# Patient Record
Sex: FEMALE | Race: WHITE | NOT HISPANIC OR LATINO | Employment: OTHER | URBAN - METROPOLITAN AREA
[De-identification: names, ages, dates, MRNs, and addresses within clinical notes are randomized per-mention and may not be internally consistent; named-entity substitution may affect disease eponyms.]

---

## 2024-10-07 ENCOUNTER — OFFICE VISIT (OUTPATIENT)
Dept: GYNECOLOGIC ONCOLOGY | Facility: CLINIC | Age: 74
End: 2024-10-07
Payer: MEDICARE

## 2024-10-07 VITALS
TEMPERATURE: 98.4 F | SYSTOLIC BLOOD PRESSURE: 128 MMHG | RESPIRATION RATE: 17 BRPM | BODY MASS INDEX: 43.4 KG/M2 | DIASTOLIC BLOOD PRESSURE: 69 MMHG | HEART RATE: 78 BPM | WEIGHT: 293 LBS | HEIGHT: 69 IN | OXYGEN SATURATION: 92 %

## 2024-10-07 DIAGNOSIS — N83.209 CYST OF OVARY, UNSPECIFIED LATERALITY: Primary | ICD-10-CM

## 2024-10-07 PROCEDURE — 99204 OFFICE O/P NEW MOD 45 MIN: CPT | Performed by: OBSTETRICS & GYNECOLOGY

## 2024-10-07 PROCEDURE — 3008F BODY MASS INDEX DOCD: CPT | Performed by: OBSTETRICS & GYNECOLOGY

## 2024-10-07 PROCEDURE — 1126F AMNT PAIN NOTED NONE PRSNT: CPT | Performed by: OBSTETRICS & GYNECOLOGY

## 2024-10-07 PROCEDURE — 1036F TOBACCO NON-USER: CPT | Performed by: OBSTETRICS & GYNECOLOGY

## 2024-10-07 PROCEDURE — 99214 OFFICE O/P EST MOD 30 MIN: CPT | Performed by: OBSTETRICS & GYNECOLOGY

## 2024-10-07 PROCEDURE — 1159F MED LIST DOCD IN RCRD: CPT | Performed by: OBSTETRICS & GYNECOLOGY

## 2024-10-07 RX ORDER — DESONIDE 0.5 MG/G
CREAM TOPICAL
COMMUNITY

## 2024-10-07 RX ORDER — CLOTRIMAZOLE 1 %
CREAM (GRAM) TOPICAL
COMMUNITY
Start: 2024-08-31

## 2024-10-07 RX ORDER — TIRZEPATIDE 7.5 MG/.5ML
INJECTION, SOLUTION SUBCUTANEOUS
COMMUNITY
Start: 2024-09-11

## 2024-10-07 RX ORDER — PRAVASTATIN SODIUM 20 MG/1
20 TABLET ORAL
COMMUNITY
Start: 2024-08-31

## 2024-10-07 RX ORDER — METFORMIN HYDROCHLORIDE 1000 MG/1
TABLET ORAL
COMMUNITY
Start: 2024-08-31

## 2024-10-07 RX ORDER — AMIODARONE HYDROCHLORIDE 200 MG/1
TABLET ORAL
COMMUNITY
Start: 2024-09-05

## 2024-10-07 RX ORDER — CYCLOBENZAPRINE HCL 10 MG
TABLET ORAL
COMMUNITY
Start: 2021-06-24

## 2024-10-07 RX ORDER — METOPROLOL TARTRATE 25 MG/1
25 TABLET, FILM COATED ORAL
COMMUNITY
Start: 2024-09-05

## 2024-10-07 RX ORDER — PANTOPRAZOLE SODIUM 40 MG/1
TABLET, DELAYED RELEASE ORAL
COMMUNITY
Start: 2024-08-31

## 2024-10-07 RX ORDER — MECLIZINE HYDROCHLORIDE 25 MG/1
TABLET ORAL 3 TIMES DAILY PRN
COMMUNITY
Start: 2021-06-10

## 2024-10-07 RX ORDER — REVEFENACIN 175 UG/3ML
175 SOLUTION RESPIRATORY (INHALATION) DAILY
COMMUNITY

## 2024-10-07 RX ORDER — LEVOTHYROXINE SODIUM 50 UG/1
TABLET ORAL
COMMUNITY
Start: 2024-08-31

## 2024-10-07 RX ORDER — ROPINIROLE 4 MG/1
1 TABLET, FILM COATED ORAL 3 TIMES DAILY
COMMUNITY
Start: 2018-09-21

## 2024-10-07 RX ORDER — ASCORBIC ACID 125 MG
TABLET,CHEWABLE ORAL
COMMUNITY
Start: 2018-09-21

## 2024-10-07 RX ORDER — AMLODIPINE BESYLATE 10 MG/1
TABLET ORAL
COMMUNITY
Start: 2024-09-16

## 2024-10-07 RX ORDER — HYDROXYZINE PAMOATE 25 MG/1
25 CAPSULE ORAL 3 TIMES DAILY PRN
COMMUNITY

## 2024-10-07 RX ORDER — LISINOPRIL 20 MG/1
TABLET ORAL 2 TIMES DAILY
COMMUNITY
Start: 2024-09-16

## 2024-10-07 RX ORDER — APIXABAN 5 MG/1
5 TABLET, FILM COATED ORAL 2 TIMES DAILY
COMMUNITY
Start: 2024-09-05

## 2024-10-07 RX ORDER — DIGOXIN 125 MCG
0.12 TABLET ORAL
COMMUNITY
Start: 2024-09-05

## 2024-10-07 ASSESSMENT — PAIN SCALES - GENERAL: PAINLEVEL: 0-NO PAIN

## 2024-10-07 NOTE — PROGRESS NOTES
Patient ID: Stewrat Mims is a 74 y.o. female.  Referring Physician: No referring provider defined for this encounter.  Primary Care Provider: Milady Jeffery MD      Subjective    Referred by [facility]    73 yo with pelvic mass    Mass history  US pelvis 8/2024 - R ovary 8 X 6 X 5.3cm cysts with septations  CT - no enlarged Lns or ascites.   13    Ob/gyn  Menopausal since late 40s.  G0.  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  No one has had genetic testing      Cyst was incidental finding at time of ER visit for kidney stone    No changes to bowel or bladder habits  No bleeding    Eating well    No weight loss or weight gain        Review of Systems   All other systems reviewed and are negative.       Objective   BSA: There is no height or weight on file to calculate BSA.  There were no vitals taken for this visit.     No family history on file.    Jeana Mims  has no history on file for tobacco use.  She  has no history on file for alcohol use.  She  has no history on file for drug use.    Physical Exam  Constitutional:       Appearance: Normal appearance.   HENT:      Head: Normocephalic and atraumatic.      Nose: Nose normal.      Mouth/Throat:      Mouth: Mucous membranes are moist.   Eyes:      Conjunctiva/sclera: Conjunctivae normal.      Pupils: Pupils are equal, round, and reactive to light.   Cardiovascular:      Rate and Rhythm: Normal rate and regular rhythm.      Pulses: Normal pulses.   Pulmonary:      Effort: Pulmonary effort is normal.      Breath sounds: Normal breath sounds.   Abdominal:      General: There is no distension.      Palpations: Abdomen is soft. There is no mass.       Tenderness: There is no abdominal tenderness.      Hernia: No hernia is present.   Genitourinary:     General: Normal vulva.      Exam position: Lithotomy position.      Vagina: Normal.      Cervix: Normal.      Uterus: Normal.       Adnexa: Left adnexa normal.        Right: Mass present.         Left: No mass.        Comments: No parametrial extension on rectovaginal exam  Ovarian mass is mobile on R side.  Musculoskeletal:         General: No swelling.      Right lower leg: No edema.      Left lower leg: No edema.   Skin:     General: Skin is warm and dry.   Neurological:      General: No focal deficit present.      Mental Status: She is alert.   Psychiatric:         Mood and Affect: Mood normal.         Thought Content: Thought content normal.         Performance Status:  Symptomatic; fully ambulatory    Assessment/Plan      Oncology History    No history exists.          Problem List Items Addressed This Visit             ICD-10-CM    Ovarian retention cyst - Primary N83.209    Relevant Orders    US pelvis transvaginal       Treatment Plans       No treatment plans exist          Reviewed CT and US reports (images at [facility]).  Discussed differential diagnosis, including both benign and malignant ovarian neoplasms.  Overall benign process is favored given overall cystic appearance, absence of symptoms, and no other findings on CT scan with normal .  Cyst discovered in context of new diagnosis of a-fib.    We discussed options for further evaluation and definitive surgery.  We came to a shared decision for repeat US in Jan 2025 to assess for interval growth /change of cyst.  Will have imaging done at [facility] and will push images to PACS.    Follow up to discuss repeat imaging.  Patient will phone the office with any new or concerning symptoms.

## 2024-10-21 ENCOUNTER — TELEPHONE (OUTPATIENT)
Dept: GYNECOLOGIC ONCOLOGY | Facility: HOSPITAL | Age: 74
End: 2024-10-21
Payer: MEDICARE

## 2024-10-21 NOTE — TELEPHONE ENCOUNTER
Phoned patient to notify that Dr. Helton reviewed 10/17/24 pelvic ultrasound result and recommends repeat ultrasound in January prior to 1/6/25 appointment with Dr. Helton.   Message left on patient voice mail with pelvic ultrasound recommendations.     Message sent to coordinators requesting scheduling of January pelvic ultrasound appointment.

## 2025-01-06 ENCOUNTER — APPOINTMENT (OUTPATIENT)
Dept: GYNECOLOGIC ONCOLOGY | Facility: CLINIC | Age: 75
End: 2025-01-06
Payer: MEDICARE

## 2025-01-13 ENCOUNTER — OFFICE VISIT (OUTPATIENT)
Dept: GYNECOLOGIC ONCOLOGY | Facility: CLINIC | Age: 75
End: 2025-01-13
Payer: MEDICARE

## 2025-01-13 VITALS
RESPIRATION RATE: 16 BRPM | HEART RATE: 75 BPM | OXYGEN SATURATION: 90 % | DIASTOLIC BLOOD PRESSURE: 83 MMHG | TEMPERATURE: 98.1 F | SYSTOLIC BLOOD PRESSURE: 150 MMHG | BODY MASS INDEX: 45.82 KG/M2 | WEIGHT: 293 LBS

## 2025-01-13 DIAGNOSIS — R93.89 THICKENED ENDOMETRIUM: ICD-10-CM

## 2025-01-13 DIAGNOSIS — N83.209 CYST OF OVARY, UNSPECIFIED LATERALITY: Primary | ICD-10-CM

## 2025-01-13 PROCEDURE — 1159F MED LIST DOCD IN RCRD: CPT | Performed by: OBSTETRICS & GYNECOLOGY

## 2025-01-13 PROCEDURE — 99213 OFFICE O/P EST LOW 20 MIN: CPT | Performed by: OBSTETRICS & GYNECOLOGY

## 2025-01-13 PROCEDURE — 1126F AMNT PAIN NOTED NONE PRSNT: CPT | Performed by: OBSTETRICS & GYNECOLOGY

## 2025-01-13 PROCEDURE — 1036F TOBACCO NON-USER: CPT | Performed by: OBSTETRICS & GYNECOLOGY

## 2025-01-13 PROCEDURE — 1160F RVW MEDS BY RX/DR IN RCRD: CPT | Performed by: OBSTETRICS & GYNECOLOGY

## 2025-01-13 ASSESSMENT — PAIN SCALES - GENERAL: PAINLEVEL_OUTOF10: 0-NO PAIN

## 2025-01-13 NOTE — PROGRESS NOTES
Patient ID: Stewart Mims is a 74 y.o. female.  Referring Physician: No referring provider defined for this encounter.  Primary Care Provider: Milady Jeffery MD    Subjective    Referred by [facility]    75 yo with pelvic mass    Mass history  US pelvis 8/2024 - R ovary 8 X 6 X 5.3cm cysts with septations  CT - no enlarged Lns or ascites.   14  Repeat US 1/2025 - 1. A 6.9 cm septated right ovarian cyst appears similar to the prior. Ultrasound follow up annually recommended.   2. Thickened endometrium. Tissue sampling recommended.     Ob/gyn  Menopausal since late 40s.  G0.  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  No one has had genetic testing    Denies bleeding.  No new symptoms              Objective    BSA: 2.62 meters squared  /83 (BP Location: Right arm, Patient Position: Sitting, BP Cuff Size: Adult)   Pulse 75   Temp 36.7 °C (98.1 °F) (Temporal)   Resp 16   Wt 141 kg (310 lb 6.5 oz)   SpO2 90%   BMI 45.82 kg/m²      Physical Exam  Vitals and nursing note reviewed. Exam conducted with a chaperone present.   Constitutional:       Appearance: Normal appearance.   HENT:      Head: Normocephalic.   Eyes:      Pupils: Pupils are equal, round, and reactive to light.   Cardiovascular:      Rate and Rhythm: Normal rate and regular rhythm.   Pulmonary:      Effort: Pulmonary effort is normal.      Breath sounds: Normal breath sounds.   Abdominal:      General: Abdomen is flat.      Palpations: Abdomen is soft.      Tenderness: There is no abdominal tenderness.   Genitourinary:     Vagina: Normal.      Cervix: Normal.      Comments: Unable to pass pipelle despite tenaculum placement.  Musculoskeletal:          General: Normal range of motion.      Cervical back: Normal range of motion and neck supple.   Skin:     General: Skin is warm and dry.   Neurological:      Mental Status: She is alert and oriented to person, place, and time.   Psychiatric:         Mood and Affect: Mood normal.         Behavior: Behavior normal.         Performance Status:  Symptomatic; fully ambulatory    Assessment/Plan     Oncology History    No history exists.        Problem List Items Addressed This Visit             ICD-10-CM    Ovarian retention cyst - Primary N83.209    Thickened endometrium R93.89    Relevant Orders    Case Request Operating Room: dilation and curettage (Completed)    Request for Pre-Admission Testing Visit       Treatment Plans       No treatment plans exist          We reviewed ultrasound findings.  Stable appearance of ovarian cyst but now with endometrial thickening .  Patient is asymptomatic but does have risk factors for endometrial hyperplasia and carcinoma.  Unable to obtain office biopsy today    Plan to schedule for outpatient D&C.  Will require scheduling at [facility] given comorbidities.  Risks of surgery reviewed.    She will be scheduled for preadmission testing before surgery

## 2025-02-12 ENCOUNTER — PRE-ADMISSION TESTING (OUTPATIENT)
Dept: PREADMISSION TESTING | Facility: HOSPITAL | Age: 75
End: 2025-02-12
Payer: MEDICARE

## 2025-02-12 VITALS
OXYGEN SATURATION: 93 % | DIASTOLIC BLOOD PRESSURE: 70 MMHG | HEIGHT: 71 IN | HEART RATE: 70 BPM | WEIGHT: 293 LBS | BODY MASS INDEX: 41.02 KG/M2 | SYSTOLIC BLOOD PRESSURE: 157 MMHG | TEMPERATURE: 97.3 F

## 2025-02-12 DIAGNOSIS — R93.89 THICKENED ENDOMETRIUM: ICD-10-CM

## 2025-02-12 DIAGNOSIS — E03.9 HYPOTHYROIDISM, UNSPECIFIED TYPE: ICD-10-CM

## 2025-02-12 DIAGNOSIS — E66.01 MORBID OBESITY (MULTI): ICD-10-CM

## 2025-02-12 DIAGNOSIS — Z01.818 PREOPERATIVE EXAMINATION: Primary | ICD-10-CM

## 2025-02-12 LAB
ANION GAP SERPL CALC-SCNC: 15 MMOL/L (ref 10–20)
BUN SERPL-MCNC: 18 MG/DL (ref 6–23)
CALCIUM SERPL-MCNC: 9.8 MG/DL (ref 8.6–10.6)
CHLORIDE SERPL-SCNC: 104 MMOL/L (ref 98–107)
CO2 SERPL-SCNC: 26 MMOL/L (ref 21–32)
CREAT SERPL-MCNC: 0.97 MG/DL (ref 0.5–1.05)
EGFRCR SERPLBLD CKD-EPI 2021: 62 ML/MIN/1.73M*2
ERYTHROCYTE [DISTWIDTH] IN BLOOD BY AUTOMATED COUNT: 14.3 % (ref 11.5–14.5)
GLUCOSE SERPL-MCNC: 106 MG/DL (ref 74–99)
HCT VFR BLD AUTO: 44.3 % (ref 36–46)
HGB BLD-MCNC: 14.7 G/DL (ref 12–16)
MCH RBC QN AUTO: 29.9 PG (ref 26–34)
MCHC RBC AUTO-ENTMCNC: 33.2 G/DL (ref 32–36)
MCV RBC AUTO: 90 FL (ref 80–100)
NRBC BLD-RTO: 0 /100 WBCS (ref 0–0)
PLATELET # BLD AUTO: 250 X10*3/UL (ref 150–450)
POTASSIUM SERPL-SCNC: 4.5 MMOL/L (ref 3.5–5.3)
RBC # BLD AUTO: 4.92 X10*6/UL (ref 4–5.2)
SODIUM SERPL-SCNC: 140 MMOL/L (ref 136–145)
WBC # BLD AUTO: 8.1 X10*3/UL (ref 4.4–11.3)

## 2025-02-12 PROCEDURE — 93010 ELECTROCARDIOGRAM REPORT: CPT | Performed by: INTERNAL MEDICINE

## 2025-02-12 PROCEDURE — 36415 COLL VENOUS BLD VENIPUNCTURE: CPT

## 2025-02-12 PROCEDURE — 93005 ELECTROCARDIOGRAM TRACING: CPT

## 2025-02-12 PROCEDURE — 80048 BASIC METABOLIC PNL TOTAL CA: CPT

## 2025-02-12 PROCEDURE — 85027 COMPLETE CBC AUTOMATED: CPT

## 2025-02-12 ASSESSMENT — ENCOUNTER SYMPTOMS
BLOOD IN STOOL: 0
CHILLS: 0
RHINORRHEA: 0
ABDOMINAL PAIN: 0
SKIN CHANGES: 0
VOICE CHANGE: 0
MYALGIAS: 0
POLYDIPSIA: 0
CONFUSION: 0
DOUBLE VISION: 0
DYSPNEA WITH EXERTION: 0
NECK STIFFNESS: 0
SINUS CONGESTION: 0
TROUBLE SWALLOWING: 0
NERVOUS/ANXIOUS: 0
NECK PAIN: 0
DIARRHEA: 0
DYSURIA: 0
LIGHT-HEADEDNESS: 0
NECK MASS: 0
NAUSEA: 0
NUMBNESS: 0
DIFFICULTY URINATING: 0
UNEXPECTED WEIGHT CHANGE: 0
JOINT SWELLING: 0
TREMORS: 0
BRUISES/BLEEDS EASILY: 0
VERTIGO: 0
ARTHRALGIAS: 0
EYE DISCHARGE: 0
EXCESSIVE BLEEDING: 0
VOMITING: 0
LIMITED RANGE OF MOTION: 0
FEVER: 0
COUGH: 0
WOUND: 0
WHEEZING: 0
HEMOPTYSIS: 0
PALPITATIONS: 0
ABDOMINAL DISTENTION: 0
SHORTNESS OF BREATH: 0
PND: 0
CONSTIPATION: 0
DYSPNEA AT REST: 0
WEAKNESS: 0
NECK SWELLING: 0
VISUAL CHANGE: 0

## 2025-02-12 ASSESSMENT — DUKE ACTIVITY SCORE INDEX (DASI)
CAN YOU DO MODERATE WORK AROUND THE HOUSE LIKE VACUUMING, SWEEPING FLOORS OR CARRYING GROCERIES: YES
CAN YOU RUN A SHORT DISTANCE: NO
CAN YOU WALK INDOORS, SUCH AS AROUND YOUR HOUSE: YES
CAN YOU HAVE SEXUAL RELATIONS: YES
CAN YOU DO LIGHT WORK AROUND THE HOUSE LIKE DUSTING OR WASHING DISHES: YES
TOTAL_SCORE: 28.45
CAN YOU DO HEAVY WORK AROUND THE HOUSE LIKE SCRUBBING FLOORS OR LIFTING AND MOVING HEAVY FURNITURE: YES
CAN YOU CLIMB A FLIGHT OF STAIRS OR WALK UP A HILL: NO
CAN YOU WALK A BLOCK OR TWO ON LEVEL GROUND: NO
DASI METS SCORE: 6.2
CAN YOU TAKE CARE OF YOURSELF (EAT, DRESS, BATHE, OR USE TOILET): YES
CAN YOU PARTICIPATE IN MODERATE RECREATIONAL ACTIVITIES LIKE GOLF, BOWLING, DANCING, DOUBLES TENNIS OR THROWING A BASEBALL OR FOOTBALL: NO
CAN YOU DO YARD WORK LIKE RAKING LEAVES, WEEDING OR PUSHING A MOWER: YES
CAN YOU PARTICIPATE IN STRENOUS SPORTS LIKE SWIMMING, SINGLES TENNIS, FOOTBALL, BASKETBALL, OR SKIING: NO

## 2025-02-12 ASSESSMENT — LIFESTYLE VARIABLES: SMOKING_STATUS: NONSMOKER

## 2025-02-12 NOTE — CPM/PAT H&P
"CPM/PAT Evaluation       Name: Jeana Premier (Jeana Premier \"Stewart\")  /Age: 1950/74 y.o.     Visit Type:   In-Person       Chief Complaint: Perioperative Evaluation    HPI HPI: 75 y/o female scheduled for dilation and curettage  on 2025  with Dr. Sue Helton secondary to Thickened endometrium.   Presents to [facility] today for perioperative risk stratification and optimization. PMHX includes HTN, HLD, Afib (Eliquis), COPD, ANAMIKA (CPAP), DM, hypothyroidism, GERD, Thickened endometrium, Ovarian Cyst.    PCP: Virgil Merino   Specialists:   Oncology - Sue Helton MD   Ortho -Jaime Mortensen MD   Pulmonology- Brock Martinez MD           Past Medical History:   Diagnosis Date    Atrial fibrillation (Multi)     COPD (chronic obstructive pulmonary disease) (Multi)     Pulmonology follows, COPD at baseline, reports feels controlled. no oxygen/exacerbations    GERD (gastroesophageal reflux disease)     controlled    Hearing aid worn     HL (hearing loss)     Hyperlipidemia     Hypertension     Hypothyroidism     Personal history of other diseases of the circulatory system     H/O: HTN (hypertension)    Personal history of other diseases of the digestive system     H/O gastroesophageal reflux (GERD)    Personal history of other endocrine, nutritional and metabolic disease     History of high cholesterol    Personal history of other endocrine, nutritional and metabolic disease     History of diabetes mellitus    Sleep apnea     CPAP    Type 2 diabetes mellitus        Past Surgical History:   Procedure Laterality Date    CARPAL TUNNEL RELEASE      FEMUR FRACTURE SURGERY  2017    Femur Repair       Patient Sexual activity questions deferred to the physician.    Family History   Problem Relation Name Age of Onset    No Known Problems Mother      No Known Problems Father         Allergies   Allergen Reactions    Valium [Diazepam] Rash    Bupropion Hcl (Smoking Deter) Unknown    Ciprofloxacin Hives, " Itching, Unknown and Rash       Prior to Admission medications    Medication Sig Start Date End Date Taking? Authorizing Provider   amiodarone (Pacerone) 200 mg tablet See Instructions, 1 tabs ORAL TID for 7 days then 1 tab BID for 14 days then 1 tab daily, 90 tabs, Date: 9/5/24 12:05:00 PM EDT, [id number], Instructions Replace Required Details, Tablet, 1 tabs ORAL TID for 7 days then 1 tab BID for 14 days then 1 tab daily, 154.9, 09/03/2024 06:08:00 EDT, Height/Length Dosing, cm, 147, 09/03/2024 06:08:00 EDT, Weight Dosing, kg 9/5/24   Historical Provider, MD   amLODIPine (Norvasc) 10 mg tablet  9/16/24   Historical Provider, MD   calcium citrate-vitamin D2 250 mg-2.5 mcg (100 unit) tablet Take 1 tablet by mouth 2 times a day.    Historical Provider, MD   cyclobenzaprine (Flexeril) 10 mg tablet Take by mouth. 6/24/21   Historical Provider, MD   desonide (DesOwen) 0.05 % cream APPLY TO AFFECTED AREAS IN SKIN FOLDS TWICE A DAY AS NEEDED FOR RASH UNTIL CLEAR    Historical Provider, MD   digoxin (Lanoxin) 125 MCG tablet 1 tablet (0.125 mg). 9/5/24   Historical Provider, MD   Eliquis 5 mg tablet 1 tablet (5 mg) 2 times a day. 9/5/24   Historical Provider, MD   fluticasone-umeclidin-vilanter (TRELEGY-ELLIPTA) 200-62.5-25 mcg blister with device     Historical Provider, MD   hydrOXYzine pamoate (Vistaril) 25 mg capsule Take 1 capsule (25 mg) by mouth 3 times a day as needed for itching.    Historical Provider, MD   levothyroxine (Synthroid, Levoxyl) 50 mcg tablet 1 tabs, ORAL, DAILY, 90 tabs, 90, Date: 9/18/24 9:06:00 PM EDT, [facility] #[id number], TAKE 1 TABLET BY MOUTH DAILY, 154.9, 09/03/2024 06:08:00 EDT, Height/Length Dosing, cm, 147, 09/03/2024 06:08:00 EDT, Weight Dosing, kg 8/31/24   Historical Provider, MD   lisinopril 20 mg tablet 2 times a day. 9/16/24   Historical Provider, MD   meclizine (Antivert) 25 mg tablet 3 times a day as needed for dizziness. 6/10/21   Historical Provider, MD    metFORMIN (Glucophage) 1,000 mg tablet  8/31/24   Historical Provider, MD   metoprolol tartrate (Lopressor) 25 mg tablet 1 tablet (25 mg). 9/5/24   Historical Provider, MD   Mounjaro 7.5 mg/0.5 mL pen injector See Instructions, INJECT 7.5 MG SUBCUTANEOUS ON MONDAY ONCE WEEKLY, ROTATE INJECTION SITES, 6 mL, Date: 10/1/24 11:27:00 AM EDT, [facility], Instructions Replace Required Details, INJECT 7.5 MG SUBCUTANEOUS ON MONDAY ONCE WEEKLY, ROTATE INJECTION SITES, 154.9, 09/03/2024 06:08:00 EDT, Height/Length Dosing, cm, 147, 09/03/2024 06:08:00 EDT, Weight Dosing, kg 9/11/24   Historical Provider, MD   multivitamin (MULTIPLE VITAMINS ORAL) Take by mouth.    Historical Provider, MD   omega 3-dha-epa-fish oil 28.5 mg(23.75- 4.75mg)-113.5mg tablet,chewable Chew. 9/21/18   Historical Provider, MD   pantoprazole (ProtoNix) 40 mg EC tablet  8/31/24   Historical Provider, MD   pravastatin (Pravachol) 20 mg tablet 1 tablet (20 mg). 8/31/24   Historical Provider, MD   rOPINIRole (Requip) 4 mg tablet Take 1 tablet (4 mg) by mouth 3 times a day. 9/21/18   Historical Provider, MD MILLER ROS:   Constitutional:    no fever   no chills   no unexpected weight change  Neuro/Psych:    no numbness   no weakness   no light-headedness   no tremors   no confusion   not nervous/anxious   no self-injury   no suicidal ideation  Eyes:    no discharge   no vision loss   no diplopia   no visual disturbance   no corrective lenses  Ears:    no ear pain   no hearing loss   no vertigo   no tinnitus   hearing aides  Nose:    no nasal discharge   no sinus congestion   no epistaxis  Mouth:    no dental issues   no mouth pain   no oral bleeding   no mouth lesions  Throat:    no throat pain   no dysphagia   no voice change  Neck:    no neck pain   neck swelling   no neck stiffness   no neck masses  Cardio:    no chest pain   no palpitations   no peripheral edema   no dyspnea   no COTTO   no paroxysmal nocturnal dyspnea  Respiratory:  "   no cough   no wheezing   no hemoptysis   no shortness of breath  Endocrine:    no cold intolerance   no heat intolerance   no polydipsia  GI:    no abdominal distention   no abdominal pain   no constipation   no diarrhea   no nausea   no vomiting   no blood in stool  :    no difficulty urinating   no dysuria   no oliguria   no polyuria  Musculoskeletal:    no arthralgias   no myalgias   no decreased ROM   no swelling  Hematologic:    does not bruise/bleed easily   no excessive bleeding   no history of blood transfusion   no blood clots  Skin:   no skin changes   no sores/wound   no rash      PAT Physical Exam     PAT AIRWAY:   Airway:     TM distance::  >3 FB    Neck ROM::  Full   Bottom left partial, bridges   partials           Visit Vitals  /70   Pulse 70   Temp 36.3 °C (97.3 °F)   Ht 1.803 m (5' 11\")   Wt 139 kg (307 lb)   SpO2 93%   BMI 42.82 kg/m²   Smoking Status Former   BSA 2.64 m²       DASI Risk Score      Flowsheet Row Pre-Admission Testing from 2/12/2025 in [facility]   Can you take care of yourself (eat, dress, bathe, or use toilet)?  2.75 filed at 02/12/2025 1310   Can you walk indoors, such as around your house? 1.75 filed at 02/12/2025 1310   Can you walk a block or two on level ground?  0 filed at 02/12/2025 1310   Can you climb a flight of stairs or walk up a hill? 0 filed at 02/12/2025 1310   Can you run a short distance? 0 filed at 02/12/2025 1310   Can you do light work around the house like dusting or washing dishes? 2.7 filed at 02/12/2025 1310   Can you do moderate work around the house like vacuuming, sweeping floors or carrying groceries? 3.5 filed at 02/12/2025 1310   Can you do heavy work around the house like scrubbing floors or lifting and moving heavy furniture?  8 filed at 02/12/2025 1310   Can you do yard work like raking leaves, weeding or pushing a mower? 4.5 filed at 02/12/2025 1310   Can you have sexual relations? 5.25 filed at 02/12/2025 1310   Can " you participate in moderate recreational activities like golf, bowling, dancing, doubles tennis or throwing a baseball or football? 0 filed at 02/12/2025 1310   Can you participate in strenous sports like swimming, singles tennis, football, basketball, or skiing? 0 filed at 02/12/2025 1310   DASI SCORE 28.45 filed at 02/12/2025 1310   METS Score (Will be calculated only when all the questions are answered) 6.2 filed at 02/12/2025 1310          Caprini DVT Assessment      Flowsheet Row Pre-Admission Testing from 2/12/2025 in [facility]   DVT Score (IF A SCORE IS NOT CALCULATING, MUST SELECT A BMI TO COMPLETE) 8 filed at 02/12/2025 1402   Medical Factors COPD, Swollen legs filed at 02/12/2025 1402   Surgical Factors Minor surgery planned filed at 02/12/2025 1402   BMI (BMI MUST BE CHOSEN) 41-50 (Morbid obesity) filed at 02/12/2025 1402          Modified Frailty Index      Flowsheet Row Pre-Admission Testing from 2/12/2025 in [facility]   Non-independent functional status (problems with dressing, bathing, personal grooming, or cooking) 0 filed at 02/12/2025 1403   History of diabetes mellitus  0.0909 filed at 02/12/2025 1403   History of COPD 0.0909 filed at 02/12/2025 1403   History of CHF No filed at 02/12/2025 1403   History of MI 0 filed at 02/12/2025 1403   History of Percutaneous Coronary Intervention, Cardiac Surgery, or Angina No filed at 02/12/2025 1403   Hypertension requiring the use of medication  0.0909 filed at 02/12/2025 1403   Peripheral vascular disease 0 filed at 02/12/2025 1403   Impaired sensorium (cognitive impairement or loss, clouding, or delirium) 0 filed at 02/12/2025 1403   TIA or CVA withouy residual deficit 0 filed at 02/12/2025 1403   Cerebrovascular accident with deficit 0 filed at 02/12/2025 1403   Modified Frailty Index Calculator .[id number] filed at 02/12/2025 1403          CHADS2 Stroke Risk  Current as of about an hour ago        4% 3 to 100%: High Risk    2 to < 3%: Medium Risk   0 to < 2%: Low Risk     No Change          This score determines the patient's risk of having a stroke if the patient has atrial fibrillation.          Points Metrics   0 Has Congestive Heart Failure:  No     Patients with congestive heart failure get 1 point.    Current as of about an hour ago   1 Has Hypertension:  Yes     Patients with hypertension get 1 point.    Current as of about an hour ago   0 Age:  74     Patients who are 75 years of age or older get 1 point.    Current as of about an hour ago   1 Has Diabetes Excluding Gestational Diabetes:  Yes      Patients with diabetes get 1 point.    Current as of about an hour ago   0 Had Stroke:  No  Had TIA:  No  Had Thromboembolism:  No     Patients who have had a stroke, TIA, or thromboembolism get 2 points.    Current as of about an hour ago             Revised Cardiac Risk Index      Flowsheet Row Pre-Admission Testing from 2/12/2025 in [facility]   High-Risk Surgery (Intraperitoneal, Intrathoracic,Suprainguinal vascular) 1 filed at 02/12/2025 1400   History of ischemic heart disease (History of MI, History of positive exercuse test, Current chest paint considered due to myocardial ischemia, Use of nitrate therapy, ECG with pathological Q Waves) 0 filed at 02/12/2025 1400   History of congestive heart failure (pulmonary edemia, bilateral rales or S3 gallop, Paroxysmal nocturnal dyspnea, CXR showing pulmonary vascular redistribution) 0 filed at 02/12/2025 1400   History of cerebrovascular disease (Prior TIA or stroke) 0 filed at 02/12/2025 1400   Pre-operative insulin treatment 0 filed at 02/12/2025 1400   Pre-operative creatinine>2 mg/dl 0 filed at 02/12/2025 1400   Revised Cardiac Risk Calculator 1 filed at 02/12/2025 1400          Apfel Simplified Score      Flowsheet Row Pre-Admission Testing from 2/12/2025 in [facility]   Smoking status 1 filed at 02/12/2025 1402   History of motion sickness or  PONV  0 filed at 02/12/2025 1402   Use of postoperative opioids 0 filed at 02/12/2025 1402   Gender - Female 1=Yes filed at 02/12/2025 1402   Apfel Simplified Score Calculator 2 filed at 02/12/2025 1402          Risk Analysis Index Results This Encounter    No data found in the last 10 encounters.       Stop Bang Score      Flowsheet Row Pre-Admission Testing from 2/12/2025 in [facility]   Do you snore loudly? 1 filed at 02/12/2025 1311   Do you often feel tired or fatigued after your sleep? 1 filed at 02/12/2025 1311   Has anyone ever observed you stop breathing in your sleep? 1 filed at 02/12/2025 1311   Do you have or are you being treated for high blood pressure? 1 filed at 02/12/2025 1311   Recent BMI (Calculated) 42.8 filed at 02/12/2025 1311   Is BMI greater than 35 kg/m2? 1=Yes filed at 02/12/2025 1311   Age older than 50 years old? 1=Yes filed at 02/12/2025 1311   Is your neck circumference greater than 17 inches (Male) or 16 inches (Female)? 0 filed at 02/12/2025 1311   Gender - Male 0=No filed at 02/12/2025 1311   STOP-BANG Total Score 6 filed at 02/12/2025 1311          Prodigy: High Risk  Total Score: 12              Prodigy Age Score           ARISCAT Score for Postoperative Pulmonary Complications      Flowsheet Row Pre-Admission Testing from 2/12/2025 in [facility]   Age Calculated Score 3 filed at 02/12/2025 1401   Preoperative SpO2 8 filed at 02/12/2025 1401   Respiratory infection in the last month Either upper or lower (i.e., URI, bronchitis, pneumonia), with fever and antibiotic treatment 0 filed at 02/12/2025 1401   Preoperative anemia (Hgb less than 10 g/dl) 0 filed at 02/12/2025 1401   Surgical incision  0 filed at 02/12/2025 1401   Duration of surgery  0 filed at 02/12/2025 1401   Emergency Procedure  0 filed at 02/12/2025 1401   ARISCAT Total Score  11 filed at 02/12/2025 1401          [place] Perioperative Risk for Myocardial Infarction or Cardiac Arrest  (THUAN)    No data to display           Assessment and Plan:   Anesthesia/Airway:  No anesthesia complications      Neuro:    No neurologic diagnoses, however, the patient is at an increased risk for post operative delirium secondary to age >/= 65.  Preoperative brain exercise educational handout provided to patient.    The patient is at an increased risk for perioperative stroke secondary to a-fib, preoperative interrupton of antithrombotic, increased age, HTN, HLD, DM , female sex , general anesthesia, and hypercoagulable state.       HEENT/Airway:    No HEENT diagnosis or significant findings on chart review or clinical presentation and evaluation. No further preoperative testing/intervention indicated at this time.      Cardiovascular:    #HTN, HLD, Afib (Eliquis),-  medicated with amiodarone (continue), amlodipine (continue), digoxin (continue), Eliquis (last dose 2/17), lisinopril (last dose morning 2/19), metoprolol tartrate (continue), pravastatin (continue), and follows with PCP. BP today is 157/70 and denies cardiovascular symptoms. Physical exam is benign. Functional capacity is sufficient. No additional preoperative testing is currently indicated.    METS are 6.2    RCRI  1 which is 6% 30 day risk of MACE (risk for cardiac death, nonfatal myocardial infarction, and nonfactal cardiac arrest    THUAN score which indicates a   0.7 % risk of intraoperative or 30-day postoperative MACE    EKG 2/12/25    Without signs of ischemia  Pending Cardiology review    Echo 9/13/2021  CONCLUSIONS:   1. The left ventricular systolic function is normal with a 60% estimated ejection fraction.   2. Poorly visualized anatomical structures due to suboptimal image quality.   3. Spectral Doppler shows an impaired relaxation pattern of left ventricular diastolic filling.   4. Normal RV function.        Pulmonary:    #COPD, ANAMIKA (CPAP) -medicated with daily Trelegy (continue), and as needed albuterol (continue).  Follows with  "Pulmonology  Patient reports that her COPD is stable and she has not had any exacerbations/hospitalizations within the past year.  Patient is not on any supplemental oxygen.  Her last pulmonology note she is doing well and successful on current medication treatment.  She had recent PFT testing, Per Pulm - \"PFT reviewed -small airway disease, mild restrictive ventilatory defect, diffusion corrected to alveolar volume normal,. F e n o normal, ABG showed PO2 67. 4.\" (Patient reports this was completed a month ago, no date indicated on note from 2/10/25). preoperative deep breathing educational handout provided to patient.  Physical exam benign, patient denies respiratory symptoms.  No further perioperative intervention.    Patient is at increased risk of perioperative complications secondary to  age > 60, COPD, obesity, types of anesthetic    ARISCAT:    11  points which is a low (1.6%) risk of in-hospital post-op pulmonary complications     PRODIGY:  17  points which is a high risk of post op opioid induced respiratory depression episodes    STOP BAN   points which is a high risk for moderate to severe ANAMIKA      Pumonary toilet education discussed, patient also provided deep breathing exercises and incentive spirometry educational handout      Renal:   No renal diagnosis, however patient is at increase risk for perioperative renal complications secondary to  Age equal to or greater than 56, BMI equal to or greater than 30, HTN, diabetes, COPD, use of an ace, arb, or NSAID      Endocrine:   #DM2, Hypothyroidism - Follows with PCP. Found on CareEverywhere. A1c 5.8 on 10/1/24, TSH 9/3/24 2.48. Patient used to follows with Endocrinology, now PCP obtains labs.  Medicated with levothyroxine (continue), metformin (hold day of surgery), Mounjaro (hold doses between now and surgery), no further testing or intervention is indicated at this time.      Hematologic:      Preoperative DVT educational handout provided to " patient.  No hematologic diagnosis, however patient is at an increased risk for DVT  Caprini Score:  8  points which is a highest risk of perioperative VTE      Gastrointestinal:   GERD-medicated with pantoprazole (continue) and is well-controlled and follows with PCP.  No further perioperative intervention    EAT-10 score of    0  : self-perceived oropharyngeal dysphagia scale (0-40)     Apfel: 2 points 39% risk for post operative N/V      Infectious disease:    #arthritis, restless leg syndrome -medicated with Requip (continue), Flexeril (continue),      Musculoskeletal:    No diagnosis or significant findings on chart review or clinical presentation and evaluation.       Other/OBGYN:     #Thickened endometrium, Ovarian Cyst -surgical intervention with Dr. Helton.    Hold all vitamins and supplements 7 days prior to surgery  Tylenol okay to continue, please hold Aleve/naproxen/ibuprofen (NSAIDs) for 7 days prior to surgery  Hold hydroxyzine day of surgery  Continue meclizine as needed for PRN dizziness      Labs ordered  Recent Results (from the past 3 weeks)   ECG 12 lead    Collection Time: 02/12/25  1:00 PM   Result Value Ref Range    Ventricular Rate 64 BPM    Atrial Rate 64 BPM    GA Interval 180 ms    QRS Duration 72 ms    QT Interval 414 ms    QTC Calculation(Bazett) 427 ms    P Axis 35 degrees    R Axis 49 degrees    T Axis 45 degrees    QRS Count 10 beats    Q Onset 213 ms    P Onset 123 ms    P Offset 183 ms    T Offset 420 ms    QTC Fredericia 422 ms   Basic Metabolic Panel    Collection Time: 02/12/25  1:36 PM   Result Value Ref Range    Glucose 106 (H) 74 - 99 mg/dL    Sodium 140 136 - 145 mmol/L    Potassium 4.5 3.5 - 5.3 mmol/L    Chloride 104 98 - 107 mmol/L    Bicarbonate 26 21 - 32 mmol/L    Anion Gap 15 10 - 20 mmol/L    Urea Nitrogen 18 6 - 23 mg/dL    Creatinine 0.97 0.50 - 1.05 mg/dL    eGFR 62 >60 mL/min/1.73m*2    Calcium 9.8 8.6 - 10.6 mg/dL   CBC    Collection Time: 02/12/25  1:36 PM    Result Value Ref Range    WBC 8.1 4.4 - 11.3 x10*3/uL    nRBC 0.0 0.0 - 0.0 /100 WBCs    RBC 4.92 4.00 - 5.20 x10*6/uL    Hemoglobin 14.7 12.0 - 16.0 g/dL    Hematocrit 44.3 36.0 - 46.0 %    MCV 90 80 - 100 fL    MCH 29.9 26.0 - 34.0 pg    MCHC 33.2 32.0 - 36.0 g/dL    RDW 14.3 11.5 - 14.5 %    Platelets 250 150 - 450 x10*3/uL           Medication instructions and NPO guidelines reviewed with the patient.  All questions or concerns discussed and addressed.      Sandra Hunt PA-C

## 2025-02-12 NOTE — PREPROCEDURE INSTRUCTIONS
Thank you for visiting [facility] ([facility]) today for your pre-procedure evaluation, you were seen by     Sandra Hunt PA-C   Department of Anesthesiology and Perioperative Medicine  Main phone [phone]  Fax [phone]      This summary includes instructions and information to aid you during your perioperative period.  Please read carefully. If you have any questions about your visit today, please call the number listed above.  If you become ill or have any changes to your health before your surgery, please contact your primary care provider and alert your surgeon.    Preparing for Surgery       Preoperative Fasting Guidelines    Why must I stop eating and drinking near surgery time?  With sedation, food or liquid in your stomach can enter your lungs causing serious complications  Food can increase nausea and vomiting  When do I need to stop eating and drinking before my surgery?  Do not eat any food after midnight the night before your surgery/procedure.  You may have up to 13.5 ounces of clear liquid until TWO hours before your instructed arrival time to the hospital.  This includes water, black tea/coffee, (no milk or cream) apple juice, and electrolyte drinks (Gatorade)  You may chew gum until TWO hours before your surgery/procedure      Tobacco and Alcohol;  Do not drink alcohol or smoke within 24 hours of surgery.  It is best to quit smoking for as long as possible before any surgery or procedure.      The Week before Surgery        Seven days before Surgery  Check your CPM medication instructions  Do the exercises provided to you by [facility]   Arrange for a responsible, adult licensed  to take you home after surgery and stay with you for 24 hours.  You will not be permitted to drive yourself home if you have received any anesthetic/sedation  Five days before surgery  Check your CPM medication instructions  Do the exercises provided to you by [facility]   Start using Chlorhexidene  (CHG) body wash if prescribed (Continue till day of surgery)      The Day before Surgery       Check your CPM medication and all other CPM instructions including when to stop eating and drinking  You will be called with your arrival time for surgery in the late afternoon.  If you do not receive a call please reach out to your surgeon's office.  Do not smoke or drink 24 hours before surgery  Prepare items to bring with you to the hospital  Shower with your chlorhexidine wash if prescribed  Brush your teeth and use your chlorhexidine dental rinse if prescribed    The Day of Surgery       Check your CPM medication instructions  Ensure you follow the instructions for when to stop eating and drinking  Shower, if prescribed use CHG.  Do not apply any lotions, creams, moisturizers, perfume or deodorant  Brush your teeth and use your CHG dental rinse if prescribed  Wear loose comfortable clothing  Avoid make-up  Remove  jewelry and piercings, consider professional piercing removal with a plastic spacer if needed  Bring photo ID and Insurance card  Bring an accurate medication list that includes medication dose, frequency and allergies  Bring a copy of your advanced directives (will, health care power of )  Bring any devices and controllers as well as medical devices you have been provided with for surgery (CPAP, slings, braces, etc.)  Dentures, eyeglasses, and contacts will be removed before surgery, please bring cases for contacts or glasses    Preoperative Deep Breathing Exercises    Why it is important to do deep breathing exercises before my surgery?  Deep breathing exercises strengthen your breathing muscles.  This helps you to recover after your surgery and decreases the chance of breathing complications.    How are the deep breathing exercises done?  Sit straight with your back supported.  Breathe in deeply and slowly through your nose. Your lower rib cage should expand and your abdomen may move  forward.  Hold that breath for 3 to 5 seconds.  Breathe out through pursed lips, slowly and completely.  Rest and repeat 10 times every hour while awake.  Rest longer if you become dizzy or lightheaded.      Preoperative Brain Exercises    What are brain exercises?  A brain exercise is any activity that engages your thinking (cognitive) skills.    What types of activities are considered brain exercises?  Jigsaw puzzles, crossword puzzles, word jumble, memory games, word search, and many more.  Many can be found free online or on your phone via a mobile chris.    Why should I do brain exercises before my surgery?  More recent research has shown brain exercise before surgery can lower the risk of postoperative delirium (confusion) which can be especially important for older adults.  Patients who did brain exercises for 5 to 10 hours the days before surgery, cut their risk of postoperative delirium in half up to 1 week after surgery.    Sit-to-Stand Exercise    What is the sit-to-stand exercise?  The sit-to-stand exercise strengthens the muscles of your lower body and muscles in the center of your body (core muscles for stability) helping to maintain and improve your strength and mobility.  How do I do the sit-to-stand exercise?  The goal is to do this exercise without using your arms or hands.  If this is too difficult, use your arms and hands or a chair with armrests to help slowly push yourself to the standing position and lower yourself back to the sitting position. As the movement becomes easier use your arms and hands less.    Steps to the sit-to-stand exercise  Sit up tall in a sturdy chair, knees bent, feet flat on the floor shoulder-width apart.  Shift your hips/pelvis forward in the chair to correctly position yourself for the next movement.  Lean forward at your hips.  Stand up straight putting equal weight on both feet.  Check to be sure you are properly aligned with the chair, in a slow controlled movement  sit back down.  Repeat this exercise 10-15 times.  If needed you can do it fewer times until your strength improves.  Rest for 1 minute.  Do another 10-15 sit-to-stand exercises.  Try to do this in the morning and evening.       Simple things you can do to help prevent blood clots     Blood clots are blockages that can form in the body's veins. When a blood clot forms in your deep veins, it may be called a deep vein thrombosis, or DVT for short. Blood clots can happen in any part of the body where blood flows, but they are most common in the arms and legs. If a piece of a blood clot breaks free and travels to the lungs, it is called a pulmonary embolus (PE). A PE can be a very serious problem.      Being in the hospital or having surgery can raise your chances of getting a blood clot because you may not be well enough to move around as much as you normally do.         Ways you can help prevent blood clots in the hospital       Wearing SCDs  SCDs stands for Sequential Compression Devices.   SCDs are special sleeves that wrap around your legs. They attach to a pump that fills them with air to gently squeeze your legs every few minutes.  This helps return the blood in your legs to your heart.   SCDs should only be taken off when walking or bathing. SCDs may not be comfortable, but they can help save your life.              Pump SCD leg sleeves  Wearing compression stockings - if your doctor orders them. These special snug-fitting stockings gently squeeze your legs to help blood flow.       Walking. Walking helps move the blood in your legs.   If your doctor says it is ok, try walking the halls at least   5 times a day. Ask us to help you get up, so you don't fall.      Taking any blood-thinning medicines your doctor orders.              Ways you can help prevent blood clots at home         Wearing compression stockings - if your doctor orders them.   Walking - to help move the blood in your legs.    Taking any  blood-thinning medicines your doctor orders.      Signs of a blood clot or PE    Tell your doctor or nurse right away if you have any of the problems listed below.         If you are at home, seek medical care right away. Call 911 for chest pain or problems breathing.            Signs of a blood clot (DVT) - such as pain, swelling, redness, or warmth in your arm or legs.  Signs of a pulmonary embolism (PE) - such as chest pain or feeling short of breath

## 2025-02-13 LAB
ATRIAL RATE: 64 BPM
P AXIS: 35 DEGREES
P OFFSET: 183 MS
P ONSET: 123 MS
PR INTERVAL: 180 MS
Q ONSET: 213 MS
QRS COUNT: 10 BEATS
QRS DURATION: 72 MS
QT INTERVAL: 414 MS
QTC CALCULATION(BAZETT): 427 MS
QTC FREDERICIA: 422 MS
R AXIS: 49 DEGREES
T AXIS: 45 DEGREES
T OFFSET: 420 MS
VENTRICULAR RATE: 64 BPM

## 2025-02-15 NOTE — HOSPITAL COURSE
"[x] PAT  [x] Plan for overnight obs -NO  [ ] Consent - needs in pre op  [x] Preop meds  [x] Add to list    Gynecologic Oncology Surgery H&P  Jeana Premier \"Stewart\" is a 74 y.o. female  with pelvic mass and thickened endometrium seen on imaging presenting for D&C    Preop labs (): Hgb 14.7, Plt 250, Cr 0.97   13    PAT (): cleared    Tumor History:  Imaging/pathology    US pelvis 2024 - R ovary 8 X 6 X 5.3cm cysts with septations  CT - no enlarged Lns or ascites.  Repeat US 2025 - 1. A 6.9 cm septated right ovarian cyst appears similar to the prior. Ultrasound follow up annually recommended.   2. Thickened endometrium. Tissue sampling recommended.     Past Medical History:  Past Medical History:   Diagnosis Date    Atrial fibrillation (Multi)     COPD (chronic obstructive pulmonary disease) (Multi)     Pulmonology follows, COPD at baseline, reports feels controlled. no oxygen/exacerbations    GERD (gastroesophageal reflux disease)     controlled    Hearing aid worn     HL (hearing loss)     Hyperlipidemia     Hypertension     Hypothyroidism     Personal history of other diseases of the circulatory system     H/O: HTN (hypertension)    Personal history of other diseases of the digestive system     H/O gastroesophageal reflux (GERD)    Personal history of other endocrine, nutritional and metabolic disease     History of high cholesterol    Personal history of other endocrine, nutritional and metabolic disease     History of diabetes mellitus    Sleep apnea     CPAP    Type 2 diabetes mellitus         Surgical History:  Appendectomy  Orthopedic surgeries on hips  Carpal tunnel release    Ob/Gyn History:  Menopausal since late 40s. G0. No prior abnormal pap smears      Social History:  Social History     Socioeconomic History    Marital status: Single   Tobacco Use    Smoking status: Former     Current packs/day: 0.00     Types: Cigarettes     Quit date: 2003     Years since quittin.0     " Passive exposure: Past    Smokeless tobacco: Never   Substance and Sexual Activity    Alcohol use: Not Currently     Alcohol/week: 2.0 standard drinks of alcohol     Types: 2 Standard drinks or equivalent per week    Drug use: Never    Sexual activity: Defer     Social Drivers of Health     Financial Resource Strain: Low Risk  (11/29/2022)    Received from [facility]    Overall Financial Resource Strain (CARDIA)     Difficulty of Paying Living Expenses: Not hard at all   Food Insecurity: No Food Insecurity (11/29/2022)    Received from [facility]    Hunger Vital Sign     Worried About Running Out of Food in the Last Year: Never true     Ran Out of Food in the Last Year: Never true   Transportation Needs: No Transportation Needs (11/29/2022)    Received from [facility]    PRAPARE - Transportation     Lack of Transportation (Medical): No     Lack of Transportation (Non-Medical): No   Physical Activity: Inactive (11/29/2022)    Received from [facility]    Exercise Vital Sign     Days of Exercise per Week: 0 days     Minutes of Exercise per Session: 0 min   Stress: No Stress Concern Present (11/29/2022)    Received from [facility]    [place] of Occupational [employer] - Occupational Stress Questionnaire     Feeling of Stress : Only a little   Social Connections: Socially Isolated (11/29/2022)    Received from [facility]    Social Connection and Isolation Panel [NHANES]     Frequency of Communication with Friends and Family: Once a week     Frequency of Social Gatherings with Friends and Family: Once a week     Attends Christian Services: Never     Active Member of Clubs or Organizations: No     Attends Club or Organization Meetings: Never     Marital Status: Never    Intimate Partner Violence: Not At Risk (11/29/2022)    Received from [facility]    Humiliation, Afraid, Rape, and Kick questionnaire     Fear of Current or Ex-Partner: No     Emotionally Abused: No     Physically Abused: No     Sexually  Abused: No        Family History:  Some women with breast cancer, non premenopausal  One relative with rectal cancer.  No one has had genetic testing     Medications:  Current Outpatient Medications   Medication Instructions    amiodarone (Pacerone) 200 mg tablet See Instructions, 1 tabs ORAL TID for 7 days then 1 tab BID for 14 days then 1 tab daily, 90 tabs, Date: 9/5/24 12:05:00 PM EDT, [facility] #[id number], Instructions Replace Required Details, Tablet, 1 tabs ORAL TID for 7 days then 1 tab BID for 14 days then 1 tab daily, 154.9, 09/03/2024 06:08:00 EDT, Height/Length Dosing, cm, 147, 09/03/2024 06:08:00 EDT, Weight Dosing, kg    amLODIPine (Norvasc) 10 mg tablet     calcium citrate-vitamin D2 250 mg-2.5 mcg (100 unit) tablet 1 tablet, 2 times daily    cyclobenzaprine (Flexeril) 10 mg tablet Take by mouth.    desonide (DesOwen) 0.05 % cream APPLY TO AFFECTED AREAS IN SKIN FOLDS TWICE A DAY AS NEEDED FOR RASH UNTIL CLEAR    digoxin (LANOXIN) 0.125 mg    Eliquis 5 mg, 2 times daily    fluticasone-umeclidin-vilanter (TRELEGY-ELLIPTA) 200-62.5-25 mcg blister with device     hydrOXYzine pamoate (VISTARIL) 25 mg, 3 times daily PRN    levothyroxine (Synthroid, Levoxyl) 50 mcg tablet 1 tabs, ORAL, DAILY, 90 tabs, 90, Date: 9/18/24 9:06:00 PM EDT, [facility] #[id number], TAKE 1 TABLET BY MOUTH DAILY, 154.9, 09/03/2024 06:08:00 EDT, Height/Length Dosing, cm, 147, 09/03/2024 06:08:00 EDT, Weight Dosing, kg    lisinopril 20 mg tablet 2 times daily    meclizine (Antivert) 25 mg tablet 3 times daily PRN    metFORMIN (Glucophage) 1,000 mg tablet     metoprolol tartrate (LOPRESSOR) 25 mg    Mounjaro 7.5 mg/0.5 mL pen injector See Instructions, INJECT 7.5 MG SUBCUTANEOUS ON MONDAY ONCE WEEKLY, ROTATE INJECTION SITES, 6 mL, Date: 10/1/24 11:27:00 AM EDT, [facility], Instructions Replace Required Details, INJECT 7.5 MG SUBCUTANEOUS ON MONDAY ONCE WEEKLY, ROTATE INJECTION SITES, 154.9, 09/03/2024  "06:08:00 EDT, Height/Length Dosing, cm, 147, 09/03/2024 06:08:00 EDT, Weight Dosing, kg    multivitamin (MULTIPLE VITAMINS ORAL) Take by mouth.    omega 3-dha-epa-fish oil 28.5 mg(23.75- 4.75mg)-113.5mg tablet,chewable Chew.    pantoprazole (ProtoNix) 40 mg EC tablet     pravastatin (PRAVACHOL) 20 mg    rOPINIRole (Requip) 4 mg tablet 1 tablet, 3 times daily       Allergies:  Valium [diazepam], Bupropion hcl (smoking deter), and Ciprofloxacin    Objective    Last Vitals  There were no vitals taken for this visit.    Physical Examination  General: no acute distress  HEENT: normocephalic, atraumatic  Heart: warm and well perfused  Lungs: breathing comfortably on room air  Abdomen: to be performed in OR  Extremities: moving all extremities  Neuro: awake and conversant  Psych: appropriate mood and affect    Lab Review  Results from last 7 days   Lab Units 02/12/25  1336   HEMOGLOBIN g/dL 14.7   HEMATOCRIT % 44.3   PLATELETS AUTO x10*3/uL 250   CREATININE mg/dL 0.97         Lab Results   Component Value Date    WBC 8.1 02/12/2025    HGB 14.7 02/12/2025    HCT 44.3 02/12/2025    MCV 90 02/12/2025     02/12/2025       Lab Results   Component Value Date    GLUCOSE 106 (H) 02/12/2025    CALCIUM 9.8 02/12/2025     02/12/2025    K 4.5 02/12/2025    CO2 26 02/12/2025     02/12/2025    BUN 18 02/12/2025    CREATININE 0.97 02/12/2025       Assessment/Plan     Jeana Premier \"Stewart\" is a 74 y.o. with pelvic mass and thickened endometrium seen on imaging  presenting for scheduled surgery.    Plan to proceed with D&C  Surgical consent was reviewed. The risks of surgery were discussed including: bleeding (including need for blood transfusion in life-threatening situations; risks of transfusion), infection, damage to surrounding organs. The patient had the opportunity to answer questions and desired to proceed with surgery following our discussion. Both verbal and written consents were obtained.    To be d/w  " Danie    Gyn Onc Pager [phone]

## 2025-02-19 ENCOUNTER — ANESTHESIA EVENT (OUTPATIENT)
Dept: OPERATING ROOM | Facility: HOSPITAL | Age: 75
End: 2025-02-19
Payer: MEDICARE

## 2025-02-20 ENCOUNTER — HOSPITAL ENCOUNTER (OUTPATIENT)
Facility: HOSPITAL | Age: 75
Setting detail: OUTPATIENT SURGERY
Discharge: HOME | End: 2025-02-20
Attending: OBSTETRICS & GYNECOLOGY | Admitting: OBSTETRICS & GYNECOLOGY
Payer: MEDICARE

## 2025-02-20 ENCOUNTER — ANESTHESIA (OUTPATIENT)
Dept: OPERATING ROOM | Facility: HOSPITAL | Age: 75
End: 2025-02-20
Payer: MEDICARE

## 2025-02-20 VITALS
HEART RATE: 68 BPM | TEMPERATURE: 97.3 F | OXYGEN SATURATION: 94 % | BODY MASS INDEX: 41.02 KG/M2 | SYSTOLIC BLOOD PRESSURE: 140 MMHG | HEIGHT: 71 IN | DIASTOLIC BLOOD PRESSURE: 63 MMHG | RESPIRATION RATE: 16 BRPM | WEIGHT: 293 LBS

## 2025-02-20 DIAGNOSIS — R93.89 THICKENED ENDOMETRIUM: ICD-10-CM

## 2025-02-20 LAB
ABO GROUP (TYPE) IN BLOOD: NORMAL
ANTIBODY SCREEN: NORMAL
GLUCOSE BLD MANUAL STRIP-MCNC: 128 MG/DL (ref 74–99)
RH FACTOR (ANTIGEN D): NORMAL

## 2025-02-20 PROCEDURE — 2500000005 HC RX 250 GENERAL PHARMACY W/O HCPCS: Performed by: OBSTETRICS & GYNECOLOGY

## 2025-02-20 PROCEDURE — 3600000008 HC OR TIME - EACH INCREMENTAL 1 MINUTE - PROCEDURE LEVEL THREE: Performed by: OBSTETRICS & GYNECOLOGY

## 2025-02-20 PROCEDURE — 88305 TISSUE EXAM BY PATHOLOGIST: CPT | Mod: TC,SUR | Performed by: OBSTETRICS & GYNECOLOGY

## 2025-02-20 PROCEDURE — 36415 COLL VENOUS BLD VENIPUNCTURE: CPT

## 2025-02-20 PROCEDURE — 3700000002 HC GENERAL ANESTHESIA TIME - EACH INCREMENTAL 1 MINUTE: Performed by: OBSTETRICS & GYNECOLOGY

## 2025-02-20 PROCEDURE — 86850 RBC ANTIBODY SCREEN: CPT

## 2025-02-20 PROCEDURE — 2500000004 HC RX 250 GENERAL PHARMACY W/ HCPCS (ALT 636 FOR OP/ED): Mod: JZ,TB | Performed by: ANESTHESIOLOGY

## 2025-02-20 PROCEDURE — 3700000001 HC GENERAL ANESTHESIA TIME - INITIAL BASE CHARGE: Performed by: OBSTETRICS & GYNECOLOGY

## 2025-02-20 PROCEDURE — 58120 DILATION AND CURETTAGE: CPT | Performed by: OBSTETRICS & GYNECOLOGY

## 2025-02-20 PROCEDURE — 7100000009 HC PHASE TWO TIME - INITIAL BASE CHARGE: Performed by: OBSTETRICS & GYNECOLOGY

## 2025-02-20 PROCEDURE — A58120 PR DILATION/CURETTAGE,DIAGNOSTIC

## 2025-02-20 PROCEDURE — 7100000001 HC RECOVERY ROOM TIME - INITIAL BASE CHARGE: Performed by: OBSTETRICS & GYNECOLOGY

## 2025-02-20 PROCEDURE — 2500000001 HC RX 250 WO HCPCS SELF ADMINISTERED DRUGS (ALT 637 FOR MEDICARE OP): Performed by: ANESTHESIOLOGY

## 2025-02-20 PROCEDURE — 3600000005 HC OR TIME - INITIAL BASE CHARGE - PROCEDURE LEVEL FIVE: Performed by: OBSTETRICS & GYNECOLOGY

## 2025-02-20 PROCEDURE — 99100 ANES PT EXTEME AGE<1 YR&>70: CPT | Performed by: ANESTHESIOLOGY

## 2025-02-20 PROCEDURE — 7100000002 HC RECOVERY ROOM TIME - EACH INCREMENTAL 1 MINUTE: Performed by: OBSTETRICS & GYNECOLOGY

## 2025-02-20 PROCEDURE — 3600000003 HC OR TIME - INITIAL BASE CHARGE - PROCEDURE LEVEL THREE: Performed by: OBSTETRICS & GYNECOLOGY

## 2025-02-20 PROCEDURE — 2500000004 HC RX 250 GENERAL PHARMACY W/ HCPCS (ALT 636 FOR OP/ED)

## 2025-02-20 PROCEDURE — 7100000010 HC PHASE TWO TIME - EACH INCREMENTAL 1 MINUTE: Performed by: OBSTETRICS & GYNECOLOGY

## 2025-02-20 PROCEDURE — 3600000010 HC OR TIME - EACH INCREMENTAL 1 MINUTE - PROCEDURE LEVEL FIVE: Performed by: OBSTETRICS & GYNECOLOGY

## 2025-02-20 PROCEDURE — 82947 ASSAY GLUCOSE BLOOD QUANT: CPT

## 2025-02-20 PROCEDURE — A58120 PR DILATION/CURETTAGE,DIAGNOSTIC: Performed by: ANESTHESIOLOGY

## 2025-02-20 PROCEDURE — 2500000001 HC RX 250 WO HCPCS SELF ADMINISTERED DRUGS (ALT 637 FOR MEDICARE OP)

## 2025-02-20 RX ORDER — WATER 1 ML/ML
IRRIGANT IRRIGATION AS NEEDED
Status: DISCONTINUED | OUTPATIENT
Start: 2025-02-20 | End: 2025-02-20 | Stop reason: HOSPADM

## 2025-02-20 RX ORDER — PROPOFOL 10 MG/ML
INJECTION, EMULSION INTRAVENOUS AS NEEDED
Status: DISCONTINUED | OUTPATIENT
Start: 2025-02-20 | End: 2025-02-20

## 2025-02-20 RX ORDER — LIDOCAINE HYDROCHLORIDE 10 MG/ML
0.1 INJECTION, SOLUTION EPIDURAL; INFILTRATION; INTRACAUDAL; PERINEURAL ONCE
Status: DISCONTINUED | OUTPATIENT
Start: 2025-02-20 | End: 2025-02-20 | Stop reason: HOSPADM

## 2025-02-20 RX ORDER — CICLOPIROX OLAMINE 7.7 MG/G
CREAM TOPICAL AS NEEDED
COMMUNITY

## 2025-02-20 RX ORDER — ARMODAFINIL 150 MG/1
150 TABLET ORAL DAILY
COMMUNITY

## 2025-02-20 RX ORDER — CELECOXIB 200 MG/1
400 CAPSULE ORAL ONCE
Status: COMPLETED | OUTPATIENT
Start: 2025-02-20 | End: 2025-02-20

## 2025-02-20 RX ORDER — LIDOCAINE HYDROCHLORIDE 20 MG/ML
INJECTION, SOLUTION INFILTRATION; PERINEURAL AS NEEDED
Status: DISCONTINUED | OUTPATIENT
Start: 2025-02-20 | End: 2025-02-20

## 2025-02-20 RX ORDER — OXYCODONE HYDROCHLORIDE 5 MG/1
5 TABLET ORAL EVERY 4 HOURS PRN
Status: DISCONTINUED | OUTPATIENT
Start: 2025-02-20 | End: 2025-02-20 | Stop reason: HOSPADM

## 2025-02-20 RX ORDER — ACETAMINOPHEN 325 MG/1
975 TABLET ORAL ONCE
Status: COMPLETED | OUTPATIENT
Start: 2025-02-20 | End: 2025-02-20

## 2025-02-20 RX ORDER — FENTANYL CITRATE 50 UG/ML
INJECTION, SOLUTION INTRAMUSCULAR; INTRAVENOUS AS NEEDED
Status: DISCONTINUED | OUTPATIENT
Start: 2025-02-20 | End: 2025-02-20

## 2025-02-20 RX ORDER — HYDROMORPHONE HYDROCHLORIDE 0.2 MG/ML
0.2 INJECTION INTRAMUSCULAR; INTRAVENOUS; SUBCUTANEOUS EVERY 5 MIN PRN
Status: DISCONTINUED | OUTPATIENT
Start: 2025-02-20 | End: 2025-02-20 | Stop reason: HOSPADM

## 2025-02-20 RX ORDER — SODIUM CHLORIDE, SODIUM LACTATE, POTASSIUM CHLORIDE, CALCIUM CHLORIDE 600; 310; 30; 20 MG/100ML; MG/100ML; MG/100ML; MG/100ML
100 INJECTION, SOLUTION INTRAVENOUS CONTINUOUS
Status: DISCONTINUED | OUTPATIENT
Start: 2025-02-20 | End: 2025-02-20 | Stop reason: HOSPADM

## 2025-02-20 RX ORDER — ONDANSETRON HYDROCHLORIDE 2 MG/ML
4 INJECTION, SOLUTION INTRAVENOUS ONCE AS NEEDED
Status: DISCONTINUED | OUTPATIENT
Start: 2025-02-20 | End: 2025-02-20 | Stop reason: HOSPADM

## 2025-02-20 RX ADMIN — FENTANYL CITRATE 25 MCG: 50 INJECTION, SOLUTION INTRAMUSCULAR; INTRAVENOUS at 12:38

## 2025-02-20 RX ADMIN — ACETAMINOPHEN 975 MG: 325 TABLET ORAL at 09:28

## 2025-02-20 RX ADMIN — CELECOXIB 400 MG: 200 CAPSULE ORAL at 09:28

## 2025-02-20 RX ADMIN — OXYCODONE HYDROCHLORIDE 5 MG: 5 TABLET ORAL at 13:49

## 2025-02-20 RX ADMIN — HYDROMORPHONE HYDROCHLORIDE 0.2 MG: 0.2 INJECTION, SOLUTION INTRAMUSCULAR; INTRAVENOUS; SUBCUTANEOUS at 13:35

## 2025-02-20 RX ADMIN — HYDROMORPHONE HYDROCHLORIDE 0.2 MG: 0.2 INJECTION, SOLUTION INTRAMUSCULAR; INTRAVENOUS; SUBCUTANEOUS at 13:15

## 2025-02-20 RX ADMIN — HYDROMORPHONE HYDROCHLORIDE 0.2 MG: 0.2 INJECTION, SOLUTION INTRAMUSCULAR; INTRAVENOUS; SUBCUTANEOUS at 13:44

## 2025-02-20 RX ADMIN — FENTANYL CITRATE 25 MCG: 50 INJECTION, SOLUTION INTRAMUSCULAR; INTRAVENOUS at 12:53

## 2025-02-20 RX ADMIN — PROPOFOL 150 MG: 10 INJECTION, EMULSION INTRAVENOUS at 12:38

## 2025-02-20 RX ADMIN — SODIUM CHLORIDE, SODIUM LACTATE, POTASSIUM CHLORIDE, AND CALCIUM CHLORIDE: 600; 310; 30; 20 INJECTION, SOLUTION INTRAVENOUS at 12:35

## 2025-02-20 RX ADMIN — HYDROMORPHONE HYDROCHLORIDE 0.2 MG: 0.2 INJECTION, SOLUTION INTRAMUSCULAR; INTRAVENOUS; SUBCUTANEOUS at 13:23

## 2025-02-20 RX ADMIN — LIDOCAINE HYDROCHLORIDE 100 MG: 20 INJECTION, SOLUTION INFILTRATION; PERINEURAL at 12:38

## 2025-02-20 SDOH — HEALTH STABILITY: MENTAL HEALTH: CURRENT SMOKER: 0

## 2025-02-20 ASSESSMENT — PAIN SCALES - GENERAL
PAINLEVEL_OUTOF10: 5 - MODERATE PAIN
PAINLEVEL_OUTOF10: 0 - NO PAIN
PAINLEVEL_OUTOF10: 5 - MODERATE PAIN
PAINLEVEL_OUTOF10: 5 - MODERATE PAIN
PAINLEVEL_OUTOF10: 7
PAINLEVEL_OUTOF10: 3
PAINLEVEL_OUTOF10: 5 - MODERATE PAIN
PAIN_LEVEL: 4
PAINLEVEL_OUTOF10: 4
PAINLEVEL_OUTOF10: 4

## 2025-02-20 ASSESSMENT — PAIN - FUNCTIONAL ASSESSMENT
PAIN_FUNCTIONAL_ASSESSMENT: 0-10

## 2025-02-20 NOTE — H&P
"Gynecologic Oncology Surgery H&P  Jeana Premier \"Stewart\" is a 74 y.o. female with pelvic mass and thickened endometrium seen on imaging presenting for D&C    Preop labs (): Hgb 14.7, Plt 250, Cr 0.97   13    PAT (): cleared    Tumor History:  Imaging/pathology    US pelvis 2024 - R ovary 8 X 6 X 5.3cm cysts with septations  CT - no enlarged Lns or ascites.  Repeat US 2025 - 1. A 6.9 cm septated right ovarian cyst appears similar to the prior. Ultrasound follow up annually recommended.   2. Thickened endometrium. Tissue sampling recommended.     Past Medical History:  Past Medical History:   Diagnosis Date    Atrial fibrillation (Multi)     COPD (chronic obstructive pulmonary disease) (Multi)     Pulmonology follows, COPD at baseline, reports feels controlled. no oxygen/exacerbations    GERD (gastroesophageal reflux disease)     controlled    Hearing aid worn     HL (hearing loss)     Hyperlipidemia     Hypertension     Hypothyroidism     Personal history of other diseases of the circulatory system     H/O: HTN (hypertension)    Personal history of other diseases of the digestive system     H/O gastroesophageal reflux (GERD)    Personal history of other endocrine, nutritional and metabolic disease     History of high cholesterol    Personal history of other endocrine, nutritional and metabolic disease     History of diabetes mellitus    Sleep apnea     CPAP    Type 2 diabetes mellitus         Surgical History:  Appendectomy  Orthopedic surgeries on hips  Carpal tunnel release    Ob/Gyn History:  Menopausal since late 40s. G0. No prior abnormal pap smears      Social History:  Social History     Socioeconomic History    Marital status: Single   Tobacco Use    Smoking status: Former     Current packs/day: 0.00     Types: Cigarettes     Quit date: 2003     Years since quittin.0     Passive exposure: Past    Smokeless tobacco: Never   Substance and Sexual Activity    Alcohol use: Not " Currently     Alcohol/week: 2.0 standard drinks of alcohol     Types: 2 Standard drinks or equivalent per week    Drug use: Never    Sexual activity: Defer     Social Drivers of Health     Financial Resource Strain: Low Risk  (11/29/2022)    Received from [facility]    Overall Financial Resource Strain (CARDIA)     Difficulty of Paying Living Expenses: Not hard at all   Food Insecurity: No Food Insecurity (11/29/2022)    Received from [facility]    Hunger Vital Sign     Worried About Running Out of Food in the Last Year: Never true     Ran Out of Food in the Last Year: Never true   Transportation Needs: No Transportation Needs (11/29/2022)    Received from [facility]    PRAPARE - Transportation     Lack of Transportation (Medical): No     Lack of Transportation (Non-Medical): No   Physical Activity: Inactive (11/29/2022)    Received from [facility]    Exercise Vital Sign     Days of Exercise per Week: 0 days     Minutes of Exercise per Session: 0 min   Stress: No Stress Concern Present (11/29/2022)    Received from [facility]    [place] of Occupational Health - Occupational Stress Questionnaire     Feeling of Stress : Only a little   Social Connections: Socially Isolated (11/29/2022)    Received from [facility]    Social Connection and Isolation Panel [NHANES]     Frequency of Communication with Friends and Family: Once a week     Frequency of Social Gatherings with Friends and Family: Once a week     Attends Spiritism Services: Never     Active Member of Clubs or Organizations: No     Attends Club or Organization Meetings: Never     Marital Status: Never    Intimate Partner Violence: Not At Risk (11/29/2022)    Received from [employer] [facility]    Humiliation, Afraid, Rape, and Kick questionnaire     Fear of Current or Ex-Partner: No     Emotionally Abused: No     Physically Abused: No     Sexually Abused: No        Family History:  Some women with breast cancer, non premenopausal  One relative with  rectal cancer.  No one has had genetic testing     Medications:  Current Outpatient Medications   Medication Instructions    amiodarone (Pacerone) 200 mg tablet See Instructions, 1 tabs ORAL TID for 7 days then 1 tab BID for 14 days then 1 tab daily, 90 tabs, Date: 9/5/24 12:05:00 PM EDT, [facility] #[id number], Instructions Replace Required Details, Tablet, 1 tabs ORAL TID for 7 days then 1 tab BID for 14 days then 1 tab daily, 154.9, 09/03/2024 06:08:00 EDT, Height/Length Dosing, cm, 147, 09/03/2024 06:08:00 EDT, Weight Dosing, kg    amLODIPine (Norvasc) 10 mg tablet     calcium citrate-vitamin D2 250 mg-2.5 mcg (100 unit) tablet 1 tablet, 2 times daily    cyclobenzaprine (Flexeril) 10 mg tablet Take by mouth.    desonide (DesOwen) 0.05 % cream APPLY TO AFFECTED AREAS IN SKIN FOLDS TWICE A DAY AS NEEDED FOR RASH UNTIL CLEAR    digoxin (LANOXIN) 0.125 mg    Eliquis 5 mg, 2 times daily    fluticasone-umeclidin-vilanter (TRELEGY-ELLIPTA) 200-62.5-25 mcg blister with device     hydrOXYzine pamoate (VISTARIL) 25 mg, 3 times daily PRN    levothyroxine (Synthroid, Levoxyl) 50 mcg tablet 1 tabs, ORAL, DAILY, 90 tabs, 90, Date: 9/18/24 9:06:00 PM EDT, [facility] #[id number], TAKE 1 TABLET BY MOUTH DAILY, 154.9, 09/03/2024 06:08:00 EDT, Height/Length Dosing, cm, 147, 09/03/2024 06:08:00 EDT, Weight Dosing, kg    lisinopril 20 mg tablet 2 times daily    meclizine (Antivert) 25 mg tablet 3 times daily PRN    metFORMIN (Glucophage) 1,000 mg tablet     metoprolol tartrate (LOPRESSOR) 25 mg    Mounjaro 7.5 mg/0.5 mL pen injector See Instructions, INJECT 7.5 MG SUBCUTANEOUS ON MONDAY ONCE WEEKLY, ROTATE INJECTION SITES, 6 mL, Date: 10/1/24 11:27:00 AM EDT, [facility], Instructions Replace Required Details, INJECT 7.5 MG SUBCUTANEOUS ON MONDAY ONCE WEEKLY, ROTATE INJECTION SITES, 154.9, 09/03/2024 06:08:00 EDT, Height/Length Dosing, cm, 147, 09/03/2024 06:08:00 EDT, Weight Dosing, kg    multivitamin  "(MULTIPLE VITAMINS ORAL) Take by mouth.    omega 3-dha-epa-fish oil 28.5 mg(23.75- 4.75mg)-113.5mg tablet,chewable Chew.    pantoprazole (ProtoNix) 40 mg EC tablet     pravastatin (PRAVACHOL) 20 mg    rOPINIRole (Requip) 4 mg tablet 1 tablet, 3 times daily       Allergies:  Valium [diazepam], Bupropion hcl (smoking deter), and Ciprofloxacin    Objective    Last Vitals: Vitals in chart were reviewed.    Physical Examination  General: no acute distress  HEENT: normocephalic, atraumatic  Heart: warm and well perfused  Lungs: breathing comfortably on room air  Abdomen: to be performed in OR  Extremities: moving all extremities  Neuro: awake and conversant  Psych: appropriate mood and affect    Lab Review  Results from last 7 days   Lab Units 02/12/25  1336   HEMOGLOBIN g/dL 14.7   HEMATOCRIT % 44.3   PLATELETS AUTO x10*3/uL 250   CREATININE mg/dL 0.97         Lab Results   Component Value Date    WBC 8.1 02/12/2025    HGB 14.7 02/12/2025    HCT 44.3 02/12/2025    MCV 90 02/12/2025     02/12/2025       Lab Results   Component Value Date    GLUCOSE 106 (H) 02/12/2025    CALCIUM 9.8 02/12/2025     02/12/2025    K 4.5 02/12/2025    CO2 26 02/12/2025     02/12/2025    BUN 18 02/12/2025    CREATININE 0.97 02/12/2025       Assessment/Plan     Jeana Premier \"Stewart\" is a 74 y.o. with pelvic mass and thickened endometrium seen on imaging presenting for scheduled surgery.    Plan to proceed with D&C  Surgical consent was reviewed.     To be d/w Dr. Helton    Gyn Onc Pager [phone]    "

## 2025-02-20 NOTE — DISCHARGE INSTRUCTIONS
Call if you have:  Call for any fever above 100.4 F (If you do not feel feverish you do not have to routinely check your temperature.)  Call for severe pain not improved by medications  Call for persistent nausea, vomiting  Difficulty breathing, headache or visual disturbances  Heavy bleeding - soaking through 1-2 pads per hour for 2 hours. (some spotting is normal)  Hives  Persistent dizziness or light-headedness  Extreme fatigue  Any other questions or concerns you may have after discharge    In an emergency, call 911 or go to an Emergency Department at a nearby hospital    You may have mild cramping. You may take tylenol every 6 hours as needed for pain.    Follow up with Dr. Helton on 3/10 at your scheduled appointment

## 2025-02-20 NOTE — ANESTHESIA PROCEDURE NOTES
Airway  Date/Time: 2/20/2025 12:41 PM  Urgency: elective    Airway not difficult    Staffing  Performed: MARIA   Authorized by: Ramirez Cobb MD    Performed by: BUTCH Thompson  Patient location during procedure: OR    Indications and Patient Condition  Indications for airway management: anesthesia  Spontaneous Ventilation: absent  Sedation level: deep  Preoxygenated: yes  Mask difficulty assessment: 1 - vent by mask  Planned trial extubation    Final Airway Details  Final airway type: supraglottic airway      Successful airway: classic  Size 4     Number of attempts at approach: 1

## 2025-02-20 NOTE — OP NOTE
"Date: 2025  OR Location: [facility] OR    Name: Jeana Mims \"Stewart\", : 1950, Age: 74 y.o., MRN: [id number], Sex: female    Diagnosis  Pre-op Diagnosis      * Thickened endometrium [R93.89] Post-op Diagnosis     * Thickened endometrium [R93.89]     Procedures  dilation and curettage  33026 - FL DILATION & CURETTAGE DX&/THER NONOBSTETRIC      Surgeons      * Seu Helton - Primary    Resident/Fellow/Other Assistant:  Surgeons and Role:     * Sydnie Trujillo MD - Resident - Assisting    Staff:   Circulator:   Relief Circulator: Valeria  Scrub Person: Shari  Scrub Person: Racquel Ibarra Person: Louann    Anesthesia Staff: Anesthesiologist: Ramirez Cobb MD  C-AA: BUTCH Thompson  MARIA: Romulo Katz    Procedure Summary  Anesthesia: General  ASA: III  Estimated Blood Loss: 5mL  Intra-op Medications: Administrations occurring from 0945 to 1055 on 25:  * No intraprocedure medications in log *           Anesthesia Record               Intraprocedure I/O Totals          Intake    LR bolus 500.00 mL    Total Intake 500 mL          Specimen:   ID Type Source Tests Collected by Time   1 : endometrial currettings Tissue ENDOMETRIUM BIOPSY SURGICAL PATHOLOGY EXAM Sue Helton MD 2025 1254          Procedure Details:  The patient was seen in the preoperative area.Preoperative antibiotics are not indicated. Venous thrombosis prophylaxis are not indicated.    The patient was taken to the operating room where GA with LMA was administered. She was then positioned in the dorsal lithotomy position. The patient was then prepped and draped in the normal sterile fashion. Next, a speculum was placed in the vagina. A single tooth tenaculum was used to grasp the anterior lip of the cervix. Speculum was removed. Ruano dilators were used to dilate the cervix up to a 21 Brazilian. Sharp curette was used to obtain endometrial curettings. Multiple passes of the curette were made with good uterine cry noted. " After sample was obtained, tenaculum was removed and site was hemostatic. All counts were correct, the patient tolerated the procedure well.  Dr. Helton was present for the entire procedure. The patient was taken to PACU in stable condition.     Findings: normal appearing external genitalia, vagina, and cervix; atrophic uterus    Complications:  None; patient tolerated the procedure well.     Disposition: PACU - hemodynamically stable.  Condition: stable

## 2025-02-20 NOTE — ANESTHESIA POSTPROCEDURE EVALUATION
"Patient: Jeana Mims \"Stewart\"    Procedure Summary       Date: 02/20/25 Room / Location: [facility] OR 03 / [facility] OR    Anesthesia Start: 1235 Anesthesia Stop: 1312    Procedure: dilation and curettage Diagnosis:       Thickened endometrium      (Thickened endometrium [R93.89])    Surgeons: Sue Helton MD Responsible Provider: Ramirez Cobb MD    Anesthesia Type: general ASA Status: 3            Anesthesia Type: general    Vitals Value Taken Time   /68 02/20/25 1306   Temp  02/20/25 1313   Pulse 63 02/20/25 1307   Resp 17 02/20/25 1307   SpO2 96 % 02/20/25 1307   Vitals shown include unfiled device data.    Anesthesia Post Evaluation    Patient location during evaluation: PACU  Patient participation: complete - patient participated  Level of consciousness: awake and alert  Pain score: 4  Pain management: adequate  Airway patency: patent  Cardiovascular status: acceptable  Respiratory status: acceptable  Hydration status: acceptable  Postoperative Nausea and Vomiting: none      No notable events documented.    "

## 2025-02-20 NOTE — ANESTHESIA PREPROCEDURE EVALUATION
"Patient: Jeana Mims \"Stweart\"    Procedure Information       Date/Time: 02/20/25 0945    Procedure: dilation and curettage    Location: [facility] OR 03 / [facility] OR    Surgeons: Sue Helton MD        ALLERGIES:  Allergies   Allergen Reactions   • Valium [Diazepam] Rash   • Bupropion Hcl (Smoking Deter) Unknown   • Ciprofloxacin Hives, Itching, Unknown and Rash        MEDICAL HISTORY:  Past Medical History:   Diagnosis Date   • Atrial fibrillation (Multi)    • COPD (chronic obstructive pulmonary disease) (Multi)     Pulmonology follows, COPD at baseline, reports feels controlled. no oxygen/exacerbations   • GERD (gastroesophageal reflux disease)     controlled   • Hearing aid worn    • HL (hearing loss)    • Hyperlipidemia    • Hypertension    • Hypothyroidism    • Personal history of other diseases of the circulatory system     H/O: HTN (hypertension)   • Personal history of other diseases of the digestive system     H/O gastroesophageal reflux (GERD)   • Personal history of other endocrine, nutritional and metabolic disease     History of high cholesterol   • Personal history of other endocrine, nutritional and metabolic disease     History of diabetes mellitus   • Sleep apnea     CPAP   • Type 2 diabetes mellitus         Relevant Problems   Genitourinary   (+) Thickened endometrium      Genitourinary and Reproductive   (+) Ovarian retention cyst        SURGICAL HISTORY:  Past Surgical History:   Procedure Laterality Date   • CARPAL TUNNEL RELEASE     • FEMUR FRACTURE SURGERY  08/16/2017    Femur Repair        MEDICATIONS:  Current Outpatient Medications   Medication Instructions   • amiodarone (Pacerone) 200 mg tablet See Instructions, 1 tabs ORAL TID for 7 days then 1 tab BID for 14 days then 1 tab daily, 90 tabs, Date: 9/5/24 12:05:00 PM [id number], Instructions Replace Required Details, Tablet, 1 tabs ORAL TID for 7 days then 1 tab BID for 14 days then 1 tab daily, 154.9, " 09/03/2024 06:08:00 EDT, Height/Length Dosing, cm, 147, 09/03/2024 06:08:00 EDT, Weight Dosing, kg   • amLODIPine (Norvasc) 10 mg tablet    • armodafinil (NUVIGIL) 150 mg, Daily   • calcium citrate-vitamin D2 250 mg-2.5 mcg (100 unit) tablet 1 tablet, 2 times daily   • ciclopirox (Ciclodan) 0.77 % cream Topical, As needed   • cyclobenzaprine (Flexeril) 10 mg tablet Take by mouth.   • desonide (DesOwen) 0.05 % cream APPLY TO AFFECTED AREAS IN SKIN FOLDS TWICE A DAY AS NEEDED FOR RASH UNTIL CLEAR   • digoxin (LANOXIN) 0.125 mg   • Eliquis 5 mg, 2 times daily   • fluticasone-umeclidin-vilanter (TRELEGY-ELLIPTA) 200-62.5-25 mcg blister with device    • hydrOXYzine pamoate (VISTARIL) 25 mg, 3 times daily PRN   • levothyroxine (Synthroid, Levoxyl) 50 mcg tablet 1 tabs, ORAL, DAILY, 90 tabs, 90, Date: 9/18/24 9:06:00 PM EDT, [id number], TAKE 1 TABLET BY MOUTH DAILY, 154.9, 09/03/2024 06:08:00 EDT, Height/Length Dosing, cm, 147, 09/03/2024 06:08:00 EDT, Weight Dosing, kg   • lisinopril 20 mg tablet 2 times daily   • meclizine (Antivert) 25 mg tablet 3 times daily PRN   • metFORMIN (Glucophage) 1,000 mg tablet    • metoprolol tartrate (LOPRESSOR) 25 mg   • Mounjaro 7.5 mg/0.5 mL pen injector See Instructions, INJECT 7.5 MG SUBCUTANEOUS ON MONDAY ONCE WEEKLY, ROTATE INJECTION SITES, 6 mL, Date: 10/1/24 11:27:00 AM EDT, [facility], Instructions Replace Required Details, INJECT 7.5 MG SUBCUTANEOUS ON MONDAY ONCE WEEKLY, ROTATE INJECTION SITES, 154.9, 09/03/2024 06:08:00 EDT, Height/Length Dosing, cm, 147, 09/03/2024 06:08:00 EDT, Weight Dosing, kg   • multivitamin (MULTIPLE VITAMINS ORAL) Take by mouth.   • omega 3-dha-epa-fish oil 28.5 mg(23.75- 4.75mg)-113.5mg tablet,chewable Chew.   • pantoprazole (ProtoNix) 40 mg EC tablet    • pravastatin (PRAVACHOL) 20 mg   • rOPINIRole (Requip) 4 mg tablet 1 tablet, 3 times daily        VITALS:      2/20/2025     9:17 AM 2/12/2025     1:09 PM 1/13/2025  "   12:07 PM   Vitals   Systolic 139 157 150   Diastolic 66 70 83   BP Location   Right arm   Heart Rate 65 70 75   Temp 36.1 °C (97 °F) 36.3 °C (97.3 °F) 36.7 °C (98.1 °F)   Resp 16  16   Height 1.803 m (5' 11\") 1.803 m (5' 11\")    Weight (lb) 309 307 310.41   BMI 43.1 kg/m2 42.82 kg/m2 45.82 kg/m2   BSA (m2) 2.65 m2 2.64 m2 2.62 m2   Visit Report   Report       LABS:   BMP   Lab Results   Component Value Date    GLUCOSE 106 (H) 02/12/2025    CALCIUM 9.8 02/12/2025     02/12/2025    K 4.5 02/12/2025    CO2 26 02/12/2025     02/12/2025    BUN 18 02/12/2025    CREATININE 0.97 02/12/2025   , LFT   Lab Results   Component Value Date    ALT 49 (H) 09/13/2021    AST 55 (H) 09/13/2021    ALKPHOS 60 09/13/2021    BILITOT 0.8 09/13/2021   , CBC  Lab Results   Component Value Date    WBC 8.1 02/12/2025    HGB 14.7 02/12/2025    HCT 44.3 02/12/2025    MCV 90 02/12/2025     02/12/2025          , Coags   Lab Results   Component Value Date/Time    PROTIME 12.9 09/08/2022 1145    INR 1.1 09/08/2022 1145    APTT 32 09/08/2022 1145      , A1C   Lab Results   Component Value Date    HGBA1C 6.6 (H) 01/19/2023       IMAGES:  EKG          Encounter Date: 02/12/25   ECG 12 lead   Result Value    Ventricular Rate 64    Atrial Rate 64    RI Interval 180    QRS Duration 72    QT Interval 414    QTC Calculation(Bazett) 427    P Axis 35    R Axis 49    T Axis 45    QRS Count 10    Q Onset 213    P Onset 123    P Offset 183    T Offset 420    QTC Fredericia 422    Narrative    Normal sinus rhythm  Indeterminate axis  Borderline ECG  When compared with ECG of 08-SEP-2022 11:09,  No significant change was found  Confirmed by Salty Gibson ([id number]) on 2/13/2025 4:39:51 PM      , ECHO       No results found for this or any previous visit from the past 730 days.    , CARDIAC CATH      No results found for this or any previous visit from the past 730 days.   , CXR     No results found for this or any previous visit from the past 730 " days.    , CT Head/Neck     No results found for this or any previous visit from the past 760 days.    , CT Chest      No results found for this or any previous visit from the past 730 days.   , CT Abdomin     No results found for this or any previous visit from the past 730 days.    SOCIAL:  Social History     Tobacco Use   Smoking Status Former   • Current packs/day: 0.00   • Types: Cigarettes   • Quit date: 2003   • Years since quittin.0   • Passive exposure: Past   Smokeless Tobacco Never      Social History     Substance and Sexual Activity   Alcohol Use Not Currently   • Alcohol/week: 2.0 standard drinks of alcohol   • Types: 2 Standard drinks or equivalent per week      Social History     Substance and Sexual Activity   Drug Use Never        NPO STATUS:  NPO/Void Status  Date of Last Liquid: 25  Time of Last Liquid: 0000  Date of Last Solid: 25  Time of Last Solid: 0000        Clinical Areas Reviewed:   Tobacco  Allergies  Meds   Med Hx  Surg Hx  OB Status  Fam Hx  Soc   Hx        Anesthesia Assessment:    Physical Exam    Airway  Mallampati: I  TM distance: >3 FB  Neck ROM: full     Cardiovascular - normal exam     Dental - normal exam     Pulmonary - normal exam     Abdominal - normal exam           Anesthesia Plan    History of general anesthesia?: yes  History of complications of general anesthesia?: no    ASA 3     general     The patient is not a current smoker.  Patient was not previously instructed to abstain from smoking on day of procedure.  Patient did not smoke on day of procedure.    intravenous induction   Postoperative administration of opioids is intended.  Anesthetic plan and risks discussed with patient.  Use of blood products discussed with patient who.    Plan discussed with CAA and attending.

## 2025-02-26 LAB
LABORATORY COMMENT REPORT: NORMAL
PATH REPORT.FINAL DX SPEC: NORMAL
PATH REPORT.GROSS SPEC: NORMAL
PATH REPORT.RELEVANT HX SPEC: NORMAL
PATH REPORT.TOTAL CANCER: NORMAL

## 2025-03-10 ENCOUNTER — TELEPHONE (OUTPATIENT)
Dept: HEMATOLOGY/ONCOLOGY | Facility: HOSPITAL | Age: 75
End: 2025-03-10
Payer: MEDICARE

## 2025-03-10 NOTE — TELEPHONE ENCOUNTER
Jeana called in to report that she is running behind for her appt with Dr Helton today and will not be able to make it. She would like a phone call to either set up another appt or to have a virtual appt to discuss D&C results. Message sent to team.  Della ZHAON, RN CMSRN

## 2025-03-11 ENCOUNTER — TELEMEDICINE (OUTPATIENT)
Dept: GYNECOLOGIC ONCOLOGY | Facility: HOSPITAL | Age: 75
End: 2025-03-11
Payer: MEDICARE

## 2025-03-11 DIAGNOSIS — N83.209 CYST OF OVARY, UNSPECIFIED LATERALITY: Primary | ICD-10-CM

## 2025-03-11 PROCEDURE — 99212 OFFICE O/P EST SF 10 MIN: CPT | Performed by: OBSTETRICS & GYNECOLOGY

## 2025-03-11 PROCEDURE — 1160F RVW MEDS BY RX/DR IN RCRD: CPT | Performed by: OBSTETRICS & GYNECOLOGY

## 2025-03-11 PROCEDURE — 1159F MED LIST DOCD IN RCRD: CPT | Performed by: OBSTETRICS & GYNECOLOGY

## 2025-03-11 NOTE — PROGRESS NOTES
Patient ID: Stewart Mims is a 74 y.o. female.  Referring Physician: No referring provider defined for this encounter.  Primary Care Provider: Milady Jeffery MD    Subjective    Referred by [facility]    73 yo with pelvic mass    Mass history  US pelvis 8/2024 - R ovary 8 X 6 X 5.3cm cysts with septations  CT - no enlarged Lns or ascites.   13  Repeat US 1/2025 - 1. A 6.9 cm septated right ovarian cyst appears similar to the prior. Ultrasound follow up annually recommended.   2. Thickened endometrium. Tissue sampling recommended.   D&C 2/2025 - atrophic endometrium, scant sample.  Tubal metaplasia    Ob/gyn  Menopausal since late 40s.  G0.  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  No one has had genetic testing  Did well after procedure.  Some spotting after the procedure, none since    No cramping            Objective    BSA: There is no height or weight on file to calculate BSA.  There were no vitals taken for this visit.     Physical Exam    Performance Status:  Asymptomatic    Assessment/Plan     Oncology History    No history exists.        Problem List Items Addressed This Visit             ICD-10-CM    Ovarian retention cyst - Primary N83.209    Relevant Orders    US pelvis transvaginal       Treatment Plans       No treatment plans exist        Doing well.   Stable ovarian cyst,  wnl.  Atrophic endometrium on sampling, although limited sample.    Would recommend repeat D&C in the event of additional bleeding vs. Definitive hysterectomy.    Follow up US at [facility] in 1 year, follow up visit to review results.    I spent 15 min in telephone discussion with the patient.

## 2025-04-27 ENCOUNTER — PATIENT MESSAGE (OUTPATIENT)
Dept: OPHTHALMOLOGY | Facility: CLINIC | Age: 75
End: 2025-04-27
Payer: MEDICARE

## 2025-04-28 NOTE — PROGRESS NOTES
"Assessment and Plan    04/25/2025 MRI brain without contrast, by report from [facility], shows \"1. Suspected subtle linear area of restricted diffusion/acute infarction in the left occipital lobe. Not clearly confirmed on FLAIR or T2-weighted images. 2. Single tiny focus of old bleed in the left lentiform nucleus. 3. Mild cortical atrophy. 4. Minimal ethmoid thickening. Mild nasal turbinate thickening and deviation of nasal septum to the right.\"  04/25/2025 MRA head, by report from [facility], shows \"Technically limited study. Unremarkable noncontrast MRA of the head.\"    04/23/2025 ESR 12. CRP <0.5 mg/dL.    04/22/2025 OCT RNFL OD illegible & -something. (Cirrus scanned)    04/24/2025 HVF 24-2 OD possible inferior arcuate MD illegible & OS inferior altitudinal MD illegible.    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 Alternative diagnoses such as inflammatory optic neuropathy/optic neuritis and arteritic anterior ischemic optic neuropathy are unlikely given the overall clinical picture including negative imaging so far and low ESR & CRP. MRI with contrast is pending, and it will further determine how much further to consider these alternatives to non-arteritic anterior ischemic optic neuropathy.    She is on medications possible implicated in increased risk for this problem, amiodarone and tirzepatide. I advised consulting her cardiologist about whether alternatives to amiodarone ar possible. The role of GLP-1 " drugs is not clear with conflicting studies on whether there is risk and what the degree of risk is.    The expectation is resolution of disc edema within the next weeks to months with stabilization of vision as the disc edema resolves, and these findings will confirm the non-arteritic anterior ischemic optic neuropathy diagnosis. The important treatments center around vasculopathic risk factor control.      Rafa Carvajal is the likely source of hallucinations. It often improves with time, and I advise no treatment now.    Plan    Decision on alternatives to amiodarone with cardiologist Dr. Raji Mendenhall.  Decision on alternative to tirzepatide with endocrinologist Dr. Doroteo Merino.  Vasculopathic risk factor control.    Follow up in 1-2 months with HVF & OCT. (Dilated 4/29/2025)

## 2025-04-29 ENCOUNTER — APPOINTMENT (OUTPATIENT)
Dept: OPHTHALMOLOGY | Facility: CLINIC | Age: 75
End: 2025-04-29
Payer: MEDICARE

## 2025-04-29 DIAGNOSIS — H53.16 CHARLES BONNET SYNDROME: ICD-10-CM

## 2025-04-29 DIAGNOSIS — H47.10 EDEMA OF OPTIC DISC OF LEFT EYE: Primary | ICD-10-CM

## 2025-04-29 PROCEDURE — 99205 OFFICE O/P NEW HI 60 MIN: CPT | Performed by: PSYCHIATRY & NEUROLOGY

## 2025-04-29 ASSESSMENT — CONF VISUAL FIELD
OS_INFERIOR_NASAL_RESTRICTION: 1
OD_INFERIOR_NASAL_RESTRICTION: 0
OD_SUPERIOR_NASAL_RESTRICTION: 0
OS_INFERIOR_TEMPORAL_RESTRICTION: 1
OD_SUPERIOR_TEMPORAL_RESTRICTION: 0
OD_NORMAL: 1
OD_INFERIOR_TEMPORAL_RESTRICTION: 0

## 2025-04-29 ASSESSMENT — ENCOUNTER SYMPTOMS
RESPIRATORY NEGATIVE: 0
EYES NEGATIVE: 1
CARDIOVASCULAR NEGATIVE: 0
MUSCULOSKELETAL NEGATIVE: 0
PSYCHIATRIC NEGATIVE: 0
HEMATOLOGIC/LYMPHATIC NEGATIVE: 0
NEUROLOGICAL NEGATIVE: 0
ENDOCRINE NEGATIVE: 0
CONSTITUTIONAL NEGATIVE: 0
ALLERGIC/IMMUNOLOGIC NEGATIVE: 0
GASTROINTESTINAL NEGATIVE: 0

## 2025-04-29 ASSESSMENT — REFRACTION_MANIFEST
OS_CYLINDER: -1.00
OS_AXIS: 155
OD_SPHERE: -1.50
OS_SPHERE: +0.00

## 2025-04-29 ASSESSMENT — TONOMETRY
IOP_METHOD: TONOPEN
OS_IOP_MMHG: 13
OD_IOP_MMHG: 12

## 2025-04-29 ASSESSMENT — SLIT LAMP EXAM - LIDS
COMMENTS: NORMAL
COMMENTS: NORMAL

## 2025-04-29 ASSESSMENT — EXTERNAL EXAM - LEFT EYE: OS_EXAM: NORMAL

## 2025-04-29 ASSESSMENT — VISUAL ACUITY
OD_PH_SC: 20/30
METHOD: SNELLEN - LINEAR
OS_PH_SC: 20/NI
OD_SC: 20/50
OS_SC: 20/40

## 2025-04-29 ASSESSMENT — EXTERNAL EXAM - RIGHT EYE: OD_EXAM: NORMAL

## 2025-04-29 ASSESSMENT — CUP TO DISC RATIO
OD_RATIO: 0.25
OS_RATIO: OBSCURED

## 2025-04-29 NOTE — LETTER
"April 29, 2025     Maximiliano Perea MD  [id number] [facility] [address]    Patient: Jeana Mims \"Stewart\"   YOB: 1950   Date of Visit: 4/29/2025     Dear Dr. Maximiliano Perea MD:    I am writing to share my findings regarding our shared patient Stewart Mims from her visit with me on 4/29/2025.    HPI    She has seen ophthalmologist Dr. Maximiliano Perea 4/22 and 4/24 with left optic disc edema and referral to me. Of note, she has been using tirzepatide (Mounjaro). 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    She lost vision in the left eye about 2 weeks ago. She saw a gray area toward the bottom of the vision. She tried to schedule an eye examination, but she was able to get a sooner appointment with ophthalmologist Dr. Maximiliano Perea. She saw Dr. Perea 4/22 and 4/24 with left optic disc edema and referral to me. Of note, she has been using tirzepatide (Mounjaro) for about 2 years, which has been helpful for diabetes.    This patient reports the following regarding associated symptoms: headaches: no, amaurosis fugax: no, scalp tenderness: no, jaw claudication: no, fever: no, proximal myalgias: no.     She reports nasal PAP for ANAMIKA.    She has hallucinations that look like blobs moving and some lights noticeable on the edges of her vision.  Last edited by Romulo Clemons MD PhD on 4/29/2025  2:46 PM.        Diagnoses    Diagnoses and all orders for this visit:  Edema of optic disc of left eye (Primary)  Rafa Bonnet syndrome    Assessment and Plan    04/25/2025 MRI " "brain without contrast, by report from [facility], shows \"1. Suspected subtle linear area of restricted diffusion/acute infarction in the left occipital lobe. Not clearly confirmed on FLAIR or T2-weighted images. 2. Single tiny focus of old bleed in the left lentiform nucleus. 3. Mild cortical atrophy. 4. Minimal ethmoid thickening. Mild nasal turbinate thickening and deviation of nasal septum to the right.\"  04/25/2025 MRA head, by report from [facility], shows \"Technically limited study. Unremarkable noncontrast MRA of the head.\"    04/23/2025 ESR 12. CRP <0.5 mg/dL.    04/22/2025 OCT RNFL OD illegible & -something. (Cirrus scanned)    04/24/2025 HVF 24-2 OD possible inferior arcuate MD illegible & OS inferior altitudinal MD illegible.    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 Alternative diagnoses such as inflammatory optic neuropathy/optic neuritis and arteritic anterior ischemic optic neuropathy are unlikely given the overall clinical picture including negative imaging so far and low ESR & CRP. MRI with contrast is pending, and it will further determine how much further to consider these alternatives to non-arteritic anterior ischemic optic neuropathy.    She is on medications possible implicated in increased risk for this problem, amiodarone and tirzepatide. I advised consulting her cardiologist about whether alternatives to amiodarone ar possible. The role of GLP-1 drugs is not clear with conflicting " studies on whether there is risk and what the degree of risk is.    The expectation is resolution of disc edema within the next weeks to months with stabilization of vision as the disc edema resolves, and these findings will confirm the non-arteritic anterior ischemic optic neuropathy diagnosis. The important treatments center around vasculopathic risk factor control.      Rafa Carvajal is the likely source of hallucinations. It often improves with time, and I advise no treatment now.    Plan    Decision on alternatives to amiodarone with cardiologist Dr. Raji Mendenhall.  Decision on alternative to tirzepatide with endocrinologist Dr. Doroteo Merino.  Vasculopathic risk factor control.    Follow up in 1-2 months with HVF & OCT. (Dilated 4/29/2025)      Below you will find my full examination. I appreciate the opportunity to see Stewart Mims today and to share in her care with you. Please contact me if you have questions for me regarding this visit or if I can be of assistance to another of your patients with neuro-ophthalmological problems.    Sincerely,    Romulo Clemons MD PhD    CC:   MD Raji Doty MD PhD      Base Eye Exam       Visual Acuity (Snellen - Linear)         Right Left    Dist sc 20/50 20/40    Dist ph sc 20/30 20/NI              Tonometry (Tonopen, 2:28 PM)         Right Left    Pressure 12 13              Pupils         Dark Light Shape React APD    Right 5 3 Round Brisk None    Left 5 3 Round Brisk 0.9 log units              Visual Fields         Left Right      Full    Restrictions Total inferior temporal, inferior nasal deficiencies               Extraocular Movement         Right Left     Full, Ortho Full, Ortho              Neuro/Psych       Oriented x3: Yes              Dilation       Both eyes: 1% Mydriacyl & 2.5% Luis Armando  @ 2:54 PM                  Additional Tests       Color         Right Left    Ishihara 11/11 2/11                  Slit Lamp and  Fundus Exam       External Exam         Right Left    External Normal Normal              Slit Lamp Exam         Right Left    Lids/Lashes Normal Normal    Conjunctiva/Sclera White and quiet White and quiet    Cornea Veticillata Veticillata    Anterior Chamber Deep and quiet Deep and quiet    Iris Round and reactive Round and reactive    Lens 2+ Nuclear sclerosis 2+ Nuclear sclerosis    Anterior Vitreous Normal Normal              Fundus Exam         Right Left    Disc Normal Grade 4 edema with nasal splinter heme    C/D Ratio 0.25 obscured    Macula Normal Normal    Vessels Normal Normal    Periphery Normal Normal                  Refraction       Manifest Refraction         Sphere Cylinder [place] Dist [place]    Right -1.50   20/25    Left +0.00 -1.00 155 20/30-2

## 2025-05-07 ENCOUNTER — HOSPITAL ENCOUNTER (OUTPATIENT)
Dept: RADIOLOGY | Facility: EXTERNAL LOCATION | Age: 75
Discharge: HOME | End: 2025-05-07

## 2025-05-08 ENCOUNTER — HOSPITAL ENCOUNTER (OUTPATIENT)
Dept: RADIOLOGY | Facility: EXTERNAL LOCATION | Age: 75
Discharge: HOME | End: 2025-05-08

## 2025-07-15 ENCOUNTER — APPOINTMENT (OUTPATIENT)
Dept: OPHTHALMOLOGY | Facility: CLINIC | Age: 75
End: 2025-07-15
Payer: MEDICARE

## 2025-07-15 DIAGNOSIS — H47.10 EDEMA OF OPTIC DISC OF LEFT EYE: Primary | ICD-10-CM

## 2025-07-15 DIAGNOSIS — H53.16 CHARLES BONNET SYNDROME: ICD-10-CM

## 2025-07-15 PROCEDURE — 99214 OFFICE O/P EST MOD 30 MIN: CPT | Performed by: PSYCHIATRY & NEUROLOGY

## 2025-07-15 PROCEDURE — 92133 CPTRZD OPH DX IMG PST SGM ON: CPT | Performed by: PSYCHIATRY & NEUROLOGY

## 2025-07-15 PROCEDURE — 92083 EXTENDED VISUAL FIELD XM: CPT | Performed by: PSYCHIATRY & NEUROLOGY

## 2025-07-15 ASSESSMENT — CONF VISUAL FIELD
OD_INFERIOR_NASAL_RESTRICTION: 0
OS_INFERIOR_NASAL_RESTRICTION: 3
COMMENTS: SEE HVF
OS_SUPERIOR_NASAL_RESTRICTION: 3
OD_INFERIOR_TEMPORAL_RESTRICTION: 0
OD_SUPERIOR_TEMPORAL_RESTRICTION: 0
OD_SUPERIOR_NASAL_RESTRICTION: 0
OS_SUPERIOR_TEMPORAL_RESTRICTION: 3
OS_INFERIOR_TEMPORAL_RESTRICTION: 3
OD_NORMAL: 1

## 2025-07-15 ASSESSMENT — TONOMETRY
OS_IOP_MMHG: 12
OD_IOP_MMHG: 12
IOP_METHOD: GOLDMANN APPLANATION

## 2025-07-15 ASSESSMENT — ENCOUNTER SYMPTOMS
NEUROLOGICAL NEGATIVE: 1
PSYCHIATRIC NEGATIVE: 0
CONSTITUTIONAL NEGATIVE: 0
GASTROINTESTINAL NEGATIVE: 0
ALLERGIC/IMMUNOLOGIC NEGATIVE: 0
ENDOCRINE NEGATIVE: 0
RESPIRATORY NEGATIVE: 0
MUSCULOSKELETAL NEGATIVE: 0
HEMATOLOGIC/LYMPHATIC NEGATIVE: 0
EYES NEGATIVE: 1
CARDIOVASCULAR NEGATIVE: 0

## 2025-07-15 ASSESSMENT — CUP TO DISC RATIO
OS_RATIO: 0.25
OD_RATIO: 0.25

## 2025-07-15 ASSESSMENT — VISUAL ACUITY
METHOD: SNELLEN - LINEAR
OS_SC: 20/60-1
OD_SC: 20/40

## 2025-07-15 ASSESSMENT — SLIT LAMP EXAM - LIDS
COMMENTS: NORMAL
COMMENTS: NORMAL

## 2025-07-15 ASSESSMENT — EXTERNAL EXAM - RIGHT EYE: OD_EXAM: NORMAL

## 2025-07-15 ASSESSMENT — EXTERNAL EXAM - LEFT EYE: OS_EXAM: NORMAL

## 2025-07-15 NOTE — LETTER
"July 15, 2025     Maximiliano Perea MD  [id number] [facility] [address]    Patient: Jeana Mims \"Stewart\"   YOB: 1950   Date of Visit: 7/15/2025     Dear Dr. Maximiliano Perea MD:    I am writing to share my findings regarding our shared patient Stewart Mims from her visit with me on 7/15/2025.    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    She has variability in her vision, seeming to improve in the day and then be worse in the morning. However, the vision seems still to be impaired. The right eye remains good.    She stopped tirzepatide, amiodarone and modafenil. She is on new diabetic control treatment.    She continues to see blobs in her vision that sometimes look like dogs. They are not scary.  Last edited by Romulo Clemons MD PhD on 7/15/2025 11:29 AM.        Diagnoses    Diagnoses and all orders for this visit:  Edema of optic disc of left eye (Primary)  -     OCT, Optic Nerve - OU - Both Eyes  -     Brenner Visual Field - OU - Both Eyes  -     Return visit; Future  Rafa Bonnet syndrome    Assessment and Plan    04/25/2025 MRI brain without contrast, which I personally reviewed, shows bihemispheric white matter FLAIR changes and by report from [facility], shows \"1. Suspected subtle linear area of restricted diffusion/acute infarction in the left occipital lobe. Not clearly confirmed on FLAIR or T2-weighted images. " "2. Single tiny focus of old bleed in the left lentiform nucleus. 3. Mild cortical atrophy. 4. Minimal ethmoid thickening. Mild nasal turbinate thickening and deviation of nasal septum to the right.\"  04/25/2025 MRA head, by report from [facility], shows \"Technically limited study. Unremarkable noncontrast MRA of the head.\"    04/23/2025 ESR 12. CRP <0.5 mg/dL.    07/15/2025 OCT RNFL OD 86 & OS 44. (Normal OD & atrophy OS)  04/22/2025 OCT RNFL OD illegible & -something. (Cirrus scanned)    07/15/2025 HVF 24-2 OD fovea 27, general reduction MD -6.83 & OS fovea <0, generalized depression MD -29.85.  04/24/2025 HVF 24-2 OD possible inferior arcuate MD illegible & OS inferior altitudinal MD illegible.    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    The expected resolution of disc edema with stabilization of vision as the disc edema resolved has happening confirming the non-arteritic anterior ischemic optic neuropathy diagnosis. She has not developed new problems of giant cell arteritis. We discussed secondary risk reduction.    Rafa Carvajal is the likely source of hallucinations. It often improves with time, and I advise no treatment now.    Plan    Continue off amiodarone with cardiologist Dr. Raji Mendenhall.  Continue off tirzepatide with endocrinologist Dr. Doroteo Merino.  Vasculopathic risk factor control.    Follow up in 6 months with HVF & OCT. (Dilated 4/29/2025)      Below you will find my full examination. I appreciate the opportunity to see Stewart Mims today and to share in her care with you. Please contact me if you have questions for me regarding this visit or if I can be of assistance to another of your patients with neuro-ophthalmological problems.    Sincerely,    Romulo Clemons MD PhD    CC: "   MD Raji Doty MD PhD      Base Eye Exam       Visual Acuity (Snellen - Linear)         Right Left    Dist sc 20/40 20/60-1    Dist ph sc  NI              Tonometry (Goldmann Applanation, 10:04 AM)         Right Left    Pressure 12 12              Pupils         Dark Light Shape React APD    Right 5 3 Round Brisk None    Left 5 3 Round Brisk >1.8 log units              Visual Fields         Left Right      Full    Restrictions Partial outer superior temporal, inferior temporal, superior nasal, inferior nasal deficiencies    See HVF             Extraocular Movement         Right Left     Full, Ortho Full, Ortho   Full but saccadic              Neuro/Psych       Oriented x3: Yes                  Additional Tests       Color         Right Left    Ishihara 4/11 0/11                  Slit Lamp and Fundus Exam       External Exam         Right Left    External Normal Normal              Slit Lamp Exam         Right Left    Lids/Lashes Normal Normal    Conjunctiva/Sclera White and quiet White and quiet    Cornea Verticillata Verticillata    Anterior Chamber Deep and quiet Deep and quiet    Iris Round and reactive Round and reactive    Lens 2+ Nuclear sclerosis 2+ Nuclear sclerosis    Anterior Vitreous Normal Normal              Fundus Exam         Right Left    Disc Normal Optic disc atrophy    C/D Ratio 0.25 0.25    Macula Normal Normal    Vessels Normal Normal    Periphery Normal Normal

## 2025-07-15 NOTE — PROGRESS NOTES
"Assessment and Plan    04/25/2025 MRI brain without contrast, which I personally reviewed, shows bihemispheric white matter FLAIR changes and by report from [facility], shows \"1. Suspected subtle linear area of restricted diffusion/acute infarction in the left occipital lobe. Not clearly confirmed on FLAIR or T2-weighted images. 2. Single tiny focus of old bleed in the left lentiform nucleus. 3. Mild cortical atrophy. 4. Minimal ethmoid thickening. Mild nasal turbinate thickening and deviation of nasal septum to the right.\"  04/25/2025 MRA head, by report from [facility], shows \"Technically limited study. Unremarkable noncontrast MRA of the head.\"    04/23/2025 ESR 12. CRP <0.5 mg/dL.    07/15/2025 OCT RNFL OD 86 & OS 44. (Normal OD & atrophy OS)  04/22/2025 OCT RNFL OD illegible & -something. (Cirrus scanned)    07/15/2025 HVF 24-2 OD fovea 27, general reduction MD -6.83 & OS fovea <0, generalized depression MD -29.85.  04/24/2025 HVF 24-2 OD possible inferior arcuate MD illegible & OS inferior altitudinal MD illegible.    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    The expected resolution of disc edema with stabilization of vision as the disc edema resolved has happening confirming the non-arteritic anterior ischemic optic neuropathy diagnosis. She has not developed new problems of giant cell arteritis. We discussed secondary risk reduction.    Rafa Carvajal is the likely source of hallucinations. It often improves with time, and I advise no treatment now.    Plan    Continue off amiodarone with cardiologist Dr. Raji Mendenhall.  Continue off tirzepatide with endocrinologist Dr. Doroteo Merino.  Vasculopathic risk factor control.    Follow up in 6 months with HVF & OCT. (Dilated 4/29/2025)  "

## 2026-01-20 ENCOUNTER — APPOINTMENT (OUTPATIENT)
Dept: OPHTHALMOLOGY | Facility: CLINIC | Age: 76
End: 2026-01-20
Payer: MEDICARE

## (undated) DEVICE — COVER, CART, 45 X 27 X 48 IN, CLEAR

## (undated) DEVICE — SPONGE, GAUZE, XRAY DECT, 16 PLY, 4 X 4, W/MASTER DMT,STERILE

## (undated) DEVICE — COVER, LIGHT HANDLE, SURGICAL, FLEXIBLE, DISPOSABLE, STERILE

## (undated) DEVICE — COVER, TABLE, 44 X 75 IN, DISPOSABLE, LF, STERILE

## (undated) DEVICE — MANIFOLD, 4 PORT NEPTUNE STANDARD

## (undated) DEVICE — REST, HEAD, BAGEL, 9 IN

## (undated) DEVICE — DRESSING, NON-ADHERENT, TELFA, OUCHLESS, 3 X 8 IN, STERILE

## (undated) DEVICE — Device

## (undated) DEVICE — MARKER, SKIN, RULER AND LABEL PACK, CUSTOM

## (undated) DEVICE — CATHETER, URETHRAL, ROBNEL, 16 FR, 16 IN, LF, RED

## (undated) DEVICE — DRAPE, PAD, PREP, W/ 9 IN CUFF, 24 X 41, LF, NS

## (undated) DEVICE — PREP TRAY, SKIN, DRY, W/GLOVES